# Patient Record
Sex: MALE | NOT HISPANIC OR LATINO | Employment: FULL TIME | URBAN - NONMETROPOLITAN AREA
[De-identification: names, ages, dates, MRNs, and addresses within clinical notes are randomized per-mention and may not be internally consistent; named-entity substitution may affect disease eponyms.]

---

## 2017-01-06 ENCOUNTER — HOSPITAL ENCOUNTER (INPATIENT)
Facility: HOSPITAL | Age: 47
LOS: 2 days | Discharge: HOME | End: 2017-01-08
Attending: FAMILY MEDICINE | Admitting: INTERNAL MEDICINE

## 2017-01-06 ENCOUNTER — APPOINTMENT (OUTPATIENT)
Dept: GENERAL RADIOLOGY | Facility: HOSPITAL | Age: 47
End: 2017-01-06

## 2017-01-06 DIAGNOSIS — I21.02 ST ELEVATION (STEMI) MYOCARDIAL INFARCTION INVOLVING LEFT ANTERIOR DESCENDING CORONARY ARTERY (HCC): ICD-10-CM

## 2017-01-06 DIAGNOSIS — I24.1 POSTMYOCARDIAL INFARCTION SYNDROME (HCC): Primary | ICD-10-CM

## 2017-01-06 LAB
ALBUMIN SERPL-MCNC: 4.2 G/DL (ref 3.5–5)
ALBUMIN/GLOB SERPL: 1.3 G/DL (ref 1.1–2.5)
ALP SERPL-CCNC: 73 U/L (ref 24–120)
ALT SERPL W P-5'-P-CCNC: 94 U/L (ref 0–54)
ANION GAP SERPL CALCULATED.3IONS-SCNC: 12 MMOL/L (ref 4–13)
AST SERPL-CCNC: 54 U/L (ref 7–45)
BASOPHILS # BLD AUTO: 0.04 10*3/MM3 (ref 0–0.2)
BASOPHILS NFR BLD AUTO: 0.5 % (ref 0–2)
BILIRUB SERPL-MCNC: 0.5 MG/DL (ref 0.1–1)
BUN BLD-MCNC: 20 MG/DL (ref 5–21)
BUN/CREAT SERPL: 23.3 (ref 7–25)
CALCIUM SPEC-SCNC: 9.2 MG/DL (ref 8.4–10.4)
CHLORIDE SERPL-SCNC: 98 MMOL/L (ref 98–110)
CO2 SERPL-SCNC: 25 MMOL/L (ref 24–31)
CREAT BLD-MCNC: 0.86 MG/DL (ref 0.5–1.4)
DEPRECATED RDW RBC AUTO: 41 FL (ref 40–54)
EOSINOPHIL # BLD AUTO: 0.35 10*3/MM3 (ref 0–0.7)
EOSINOPHIL NFR BLD AUTO: 4.1 % (ref 0–4)
ERYTHROCYTE [DISTWIDTH] IN BLOOD BY AUTOMATED COUNT: 12.7 % (ref 12–15)
GFR SERPL CREATININE-BSD FRML MDRD: 116 ML/MIN/1.73
GFR SERPL CREATININE-BSD FRML MDRD: 96 ML/MIN/1.73
GLOBULIN UR ELPH-MCNC: 3.3 GM/DL
GLUCOSE BLD-MCNC: 116 MG/DL (ref 70–100)
HCT VFR BLD AUTO: 42.4 % (ref 40–52)
HGB BLD-MCNC: 14.9 G/DL (ref 14–18)
IMM GRANULOCYTES # BLD: 0.04 10*3/MM3 (ref 0–0.03)
IMM GRANULOCYTES NFR BLD: 0.5 % (ref 0–5)
INR PPP: 0.94 (ref 0.91–1.09)
LYMPHOCYTES # BLD AUTO: 2.72 10*3/MM3 (ref 0.72–4.86)
LYMPHOCYTES NFR BLD AUTO: 31.7 % (ref 15–45)
MCH RBC QN AUTO: 30.8 PG (ref 28–32)
MCHC RBC AUTO-ENTMCNC: 35.1 G/DL (ref 33–36)
MCV RBC AUTO: 87.6 FL (ref 82–95)
MONOCYTES # BLD AUTO: 0.67 10*3/MM3 (ref 0.19–1.3)
MONOCYTES NFR BLD AUTO: 7.8 % (ref 4–12)
NEUTROPHILS # BLD AUTO: 4.76 10*3/MM3 (ref 1.87–8.4)
NEUTROPHILS NFR BLD AUTO: 55.4 % (ref 39–78)
PLATELET # BLD AUTO: 369 10*3/MM3 (ref 130–400)
PMV BLD AUTO: 10.1 FL (ref 6–12)
POTASSIUM BLD-SCNC: 4.4 MMOL/L (ref 3.5–5.3)
PROT SERPL-MCNC: 7.5 G/DL (ref 6.3–8.7)
PROTHROMBIN TIME: 12.9 SECONDS (ref 11.9–14.6)
RBC # BLD AUTO: 4.84 10*6/MM3 (ref 4.8–5.9)
SODIUM BLD-SCNC: 135 MMOL/L (ref 135–145)
TROPONIN I SERPL-MCNC: 0.26 NG/ML (ref 0–0.07)
WBC NRBC COR # BLD: 8.58 10*3/MM3 (ref 4.8–10.8)

## 2017-01-06 PROCEDURE — 93010 ELECTROCARDIOGRAM REPORT: CPT | Performed by: INTERNAL MEDICINE

## 2017-01-06 PROCEDURE — 85025 COMPLETE CBC W/AUTO DIFF WBC: CPT | Performed by: FAMILY MEDICINE

## 2017-01-06 PROCEDURE — 99285 EMERGENCY DEPT VISIT HI MDM: CPT

## 2017-01-06 PROCEDURE — 25010000002 ONDANSETRON PER 1 MG: Performed by: FAMILY MEDICINE

## 2017-01-06 PROCEDURE — 71010 HC CHEST PA OR AP: CPT

## 2017-01-06 PROCEDURE — 85610 PROTHROMBIN TIME: CPT | Performed by: FAMILY MEDICINE

## 2017-01-06 PROCEDURE — 80053 COMPREHEN METABOLIC PANEL: CPT | Performed by: FAMILY MEDICINE

## 2017-01-06 PROCEDURE — 93005 ELECTROCARDIOGRAM TRACING: CPT | Performed by: FAMILY MEDICINE

## 2017-01-06 PROCEDURE — 84484 ASSAY OF TROPONIN QUANT: CPT

## 2017-01-06 PROCEDURE — 93005 ELECTROCARDIOGRAM TRACING: CPT

## 2017-01-06 PROCEDURE — 25010000002 MORPHINE PER 10 MG: Performed by: FAMILY MEDICINE

## 2017-01-06 RX ORDER — SODIUM CHLORIDE 0.9 % (FLUSH) 0.9 %
10 SYRINGE (ML) INJECTION AS NEEDED
Status: DISCONTINUED | OUTPATIENT
Start: 2017-01-06 | End: 2017-01-08 | Stop reason: HOSPADM

## 2017-01-06 RX ORDER — ATORVASTATIN CALCIUM 40 MG/1
80 TABLET, FILM COATED ORAL NIGHTLY
Status: COMPLETED | OUTPATIENT
Start: 2017-01-06 | End: 2017-01-07

## 2017-01-06 RX ORDER — COLCHICINE 0.6 MG/1
0.6 TABLET ORAL DAILY
Status: DISCONTINUED | OUTPATIENT
Start: 2017-01-06 | End: 2017-01-08 | Stop reason: HOSPADM

## 2017-01-06 RX ORDER — METOPROLOL TARTRATE 50 MG/1
50 TABLET, FILM COATED ORAL ONCE
Status: COMPLETED | OUTPATIENT
Start: 2017-01-06 | End: 2017-01-07

## 2017-01-06 RX ORDER — ONDANSETRON 2 MG/ML
4 INJECTION INTRAMUSCULAR; INTRAVENOUS ONCE
Status: COMPLETED | OUTPATIENT
Start: 2017-01-06 | End: 2017-01-06

## 2017-01-06 RX ORDER — MORPHINE SULFATE 4 MG/ML
4 INJECTION, SOLUTION INTRAMUSCULAR; INTRAVENOUS ONCE
Status: COMPLETED | OUTPATIENT
Start: 2017-01-06 | End: 2017-01-06

## 2017-01-06 RX ADMIN — ONDANSETRON 4 MG: 2 INJECTION INTRAMUSCULAR; INTRAVENOUS at 22:34

## 2017-01-06 RX ADMIN — MORPHINE SULFATE 4 MG: 4 INJECTION, SOLUTION INTRAMUSCULAR; INTRAVENOUS at 22:34

## 2017-01-06 RX ADMIN — COLCHICINE 0.6 MG: 0.6 TABLET, FILM COATED ORAL at 23:30

## 2017-01-06 NOTE — IP AVS SNAPSHOT
AFTER VISIT SUMMARY             David Sandifer           About your hospitalization     You were admitted on:  January 6, 2017 You last received care in the:  [facility]       Procedures & Surgeries         Medications    If you or your caregiver advised us that you are currently taking a medication and that medication is marked below as “Resume”, this simply indicates that we have reviewed those medications to make sure our new therapy recommendations do not interfere.  If you have concerns about medications other than those new ones which we are prescribing today, please consult the physician who prescribed them (or your primary physician).  Our review of your home medications is not meant to indicate that we are directing their use.             Your Medications      START taking these medications     colchicine 0.6 MG tablet   Take 1 tablet by mouth 2 (Two) Times a Day.   Last time this was given:  1/8/2017  9:19 AM             CONTINUE taking these medications     aspirin 81 MG chewable tablet   Chew 1 tablet Daily.   Last time this was given:  1/8/2017  9:19 AM           atorvastatin 80 MG tablet   Take 1 tablet by mouth Every Night.   Last time this was given:  1/7/2017  1:01 AM   Commonly known as:  LIPITOR           lisinopril 5 MG tablet   Take 1 tablet by mouth Daily.   Last time this was given:  1/8/2017  9:19 AM   Commonly known as:  PRINIVIL,ZESTRIL           metoprolol tartrate 50 MG tablet   Take 1 tablet by mouth Every 12 (Twelve) Hours.   Last time this was given:  1/7/2017  1:00 AM   Commonly known as:  LOPRESSOR           prasugrel 10 MG tablet   Take 1 tablet by mouth Daily.   Last time this was given:  1/8/2017  9:19 AM   Commonly known as:  EFFIENT                Where to Get Your Medications      These medications were sent to [facility]/pharmacy #[id number] - [place], [place] - [address] - [phone]  - [phone] FX  [address] [address]     Phone:   [phone]     colchicine 0.6 MG tablet                  Your Medications      Your Medication List           Morning Noon Evening Bedtime As Needed    aspirin 81 MG chewable tablet   Chew 1 tablet Daily.                                   atorvastatin 80 MG tablet   Take 1 tablet by mouth Every Night.   Commonly known as:  LIPITOR                                   colchicine 0.6 MG tablet   Take 1 tablet by mouth 2 (Two) Times a Day.                                   lisinopril 5 MG tablet   Take 1 tablet by mouth Daily.   Commonly known as:  PRINIVIL,ZESTRIL                                   metoprolol tartrate 50 MG tablet   Take 1 tablet by mouth Every 12 (Twelve) Hours.   Commonly known as:  LOPRESSOR                                   prasugrel 10 MG tablet   Take 1 tablet by mouth Daily.   Commonly known as:  EFFIENT                                            Instructions for After Discharge          Discharge Instructions       1. Take all medications as prescribed- Colchicine x1 month.   2. Monitor heart rate and blood pressure daily at home and bring readings to follow up  3. Call with any questions or concerns     Discharge References/Attachments     CARDIAC DIET, EASY-TO-READ (ENGLISH)    HOW TO TAKE YOUR BLOOD PRESSURE, EASY-TO-READ (ENGLISH)    PERICARDITIS (ENGLISH)       Follow-ups for After Discharge        Follow-up Information     Follow up with No Known Provider Follow up in 1 week(s).    Contact information:    [facility] [address]          Follow up with Jose Rasmussen MD .    Specialty:  Cardiology    Why:  As previously scheduled.     Contact information:    [address] [id number] [phone]        Scheduled Appointments     Apr 07, 2017  9:00 AM [facility] Follow Up with Jose Rasmussen MD   [facility] (--)    [address] [address] [phone]              [facility] Signup     [facility]  [employer] allows you to send messages to your doctor, view your test results, renew your prescriptions, schedule appointments, and more. To sign up, go to [employer] and click on the Sign Up Now link in the New User? box. Enter your [employer] Activation Code exactly as it appears below along with the last four digits of your Social Security Number and your Date of Birth () to complete the sign-up process. If you do not sign up before the expiration date, you must request a new code.    [employer] Activation Code: [email or web address]  Expires: 2017 12:22 PM    If you have questions, you can email [email or web address] or call [phone] to talk to our [employer] staff. Remember, [employer] is NOT to be used for urgent needs. For medical emergencies, dial 911.           Summary of Your Hospitalization        Reason for Hospitalization     Your primary diagnosis was:  Inflammation Of The Covering Of The Heart    Your diagnoses also included:  Mixed Hyperlipidemia, High Blood Pressure, Mobitz Type 2 Second Degree Heart Block, Coronary Artery Disease Involving Native Coronary Artery Of Native Heart Without Angina Pectoris      Care Providers     Provider Service Role Specialty    Ronaldo Gutierrez MD -- Attending Provider Cardiology      Your Allergies  Date Reviewed: 2017    No active allergies      Patient Belongings Returned     Document Return of Belongings Flowsheet     Were the patient bedside belongings sent home?   Yes   Belongings Retrieved from Security & Sent Home   N/A    Belongings Sent to Safe   --   Medications Retrieved from Pharmacy & Sent Home   N/A              More Information      Heart-Healthy Eating Plan  Heart-healthy meal planning includes:  · Limiting unhealthy fats.  · Increasing healthy fats.  · Making other small dietary changes.  You may need to talk with your doctor or a diet specialist (dietitian) to create an eating plan that is right for you.  WHAT TYPES OF FAT  "SHOULD I CHOOSE?  · Choose healthy fats. These include olive oil and canola oil, flaxseeds, walnuts, almonds, and seeds.  · Eat more omega-3 fats. These include salmon, mackerel, sardines, tuna, flaxseed oil, and ground flaxseeds. Try to eat fish at least twice each week.  · Limit saturated fats.    Saturated fats are often found in animal products, such as meats, butter, and cream.    Plant sources of saturated fats include palm oil, palm kernel oil, and coconut oil.  · Avoid foods with partially hydrogenated oils in them. These include stick margarine, some tub margarines, cookies, crackers, and other baked goods. These contain trans fats.  WHAT GENERAL GUIDELINES DO I NEED TO FOLLOW?  · Check food labels carefully. Identify foods with trans fats or high amounts of saturated fat.  · Fill one half of your plate with vegetables and green salads. Eat 4-5 servings of vegetables per day. A serving of vegetables is:    1 cup of raw leafy vegetables.    ½ cup of raw or cooked cut-up vegetables.    ½ cup of vegetable juice.  · Fill one fourth of your plate with whole grains. Look for the word \"whole\" as the first word in the ingredient list.  · Fill one fourth of your plate with lean protein foods.  · Eat 4-5 servings of fruit per day. A serving of fruit is:    One medium whole fruit.    ¼ cup of dried fruit.    ½ cup of fresh, frozen, or canned fruit.    ½ cup of 100% fruit juice.  · Eat more foods that contain soluble fiber. These include apples, broccoli, carrots, beans, peas, and barley. Try to get 20-30 g of fiber per day.  · Eat more home-cooked food. Eat less restaurant, buffet, and fast food.  · Limit or avoid alcohol.  · Limit foods high in starch and sugar.  · Avoid fried foods.  · Avoid frying your food. Try baking, boiling, grilling, or broiling it instead. You can also reduce fat by:    Removing the skin from poultry.    Removing all visible fats from meats.    Skimming the fat off of stews, soups, and " gravies before serving them.    Steaming vegetables in water or broth.  · Lose weight if you are overweight.  · Eat 4-5 servings of nuts, legumes, and seeds per week:    One serving of dried beans or legumes equals ½ cup after being cooked.    One serving of nuts equals 1½ ounces.    One serving of seeds equals ½ ounce or one tablespoon.  · You may need to keep track of how much salt or sodium you eat. This is especially true if you have high blood pressure. Talk with your doctor or dietitian to get more information.  WHAT FOODS CAN I EAT?  Grains  Breads, including Bahamian, white, guido, wheat, raisin, rye, oatmeal, and Italian. Tortillas that are neither fried nor made with lard or trans fat. Low-fat rolls, including hotdog and hamburger buns and English muffins. Biscuits. Muffins. Waffles. Pancakes. Light popcorn. Whole-grain cereals. Flatbread. Adelita toast. Pretzels. Breadsticks. Rusks. Low-fat snacks. Low-fat crackers, including oyster, saltine, matzo, krzysztof, animal, and rye. Rice and pasta, including brown rice and pastas that are made with whole wheat.   Vegetables  All vegetables.   Fruits  All fruits, but limit coconut.  Meats and Other Protein Sources  Lean, well-trimmed beef, veal, pork, and lamb. Chicken and turkey without skin. All fish and shellfish. Wild duck, rabbit, pheasant, and venison. Egg whites or low-cholesterol egg substitutes. Dried beans, peas, lentils, and tofu. Seeds and most nuts.  Dairy  Low-fat or nonfat cheeses, including ricotta, string, and mozzarella. Skim or 1% milk that is liquid, powdered, or evaporated. Buttermilk that is made with low-fat milk. Nonfat or low-fat yogurt.  Beverages  Mineral water. Diet carbonated beverages.  Sweets and Desserts  Sherbets and fruit ices. Honey, jam, marmalade, jelly, and syrups. Meringues and gelatins. Pure sugar candy, such as hard candy, jelly beans, gumdrops, mints, marshmallows, and small amounts of dark chocolate. Alexei food cake.  Eat all  sweets and desserts in moderation.  Fats and Oils  Nonhydrogenated (trans-free) margarines. Vegetable oils, including soybean, sesame, sunflower, olive, peanut, safflower, corn, canola, and cottonseed. Salad dressings or mayonnaise made with a vegetable oil. Limit added fats and oils that you use for cooking, baking, salads, and as spreads.  Other  Cocoa powder. Coffee and tea. All seasonings and condiments.  The items listed above may not be a complete list of recommended foods or beverages. Contact your dietitian for more options.  WHAT FOODS ARE NOT RECOMMENDED?  Grains  Breads that are made with saturated or trans fats, oils, or whole milk. Croissants. Butter rolls. Cheese breads. Sweet rolls. Donuts. Buttered popcorn. Chow mein noodles. High-fat crackers, such as cheese or butter crackers.  Meats and Other Protein Sources  Fatty meats, such as hotdogs, short ribs, sausage, spareribs, harry, rib eye roast or steak, and mutton. High-fat deli meats, such as salami and bologna. Caviar. Domestic duck and goose. Organ meats, such as kidney, liver, sweetbreads, and heart.  Dairy  Cream, sour cream, cream cheese, and creamed cottage cheese. Whole-milk cheeses, including blue (juan j), Marvin Philip, Brie, Burton, American, Havarti, Swiss, cheddar, Camembert, and Cornish Flat. Whole or 2% milk that is liquid, evaporated, or condensed. Whole buttermilk. Cream sauce or high-fat cheese sauce. Yogurt that is made from whole milk.  Beverages  Regular sodas and juice drinks with added sugar.  Sweets and Desserts  Frosting. Pudding. Cookies. Cakes other than melanie food cake. Candy that has milk chocolate or white chocolate, hydrogenated fat, butter, coconut, or unknown ingredients. Buttered syrups. Full-fat ice cream or ice cream drinks.  Fats and Oils  Gravy that has suet, meat fat, or shortening. Cocoa butter, hydrogenated oils, palm oil, coconut oil, palm kernel oil. These can often be found in baked products, candy, fried foods,  nondairy creamers, and whipped toppings. Solid fats and shortenings, including harry fat, salt pork, lard, and butter. Nondairy cream substitutes, such as coffee creamers and sour cream substitutes. Salad dressings that are made of unknown oils, cheese, or sour cream.  The items listed above may not be a complete list of foods and beverages to avoid. Contact your dietitian for more information.     This information is not intended to replace advice given to you by your health care provider. Make sure you discuss any questions you have with your health care provider.     Document Released: 06/18/2013 Document Revised: 01/08/2016 Document Reviewed: 06/11/2015  [employer] Interactive Patient Education ©2016 Elsevier Inc.          How to Take Your Blood Pressure  HOW DO I GET A BLOOD PRESSURE MACHINE?  · You can buy an electronic home blood pressure machine at your local pharmacy. Insurance will sometimes cover the cost if you have a prescription.  · Ask your doctor what type of machine is best for you. There are different machines for your arm and your wrist.  · If you decide to buy a machine to check your blood pressure on your arm, first check the size of your arm so you can buy the right size cuff. To check the size of your arm:      Use a measuring tape that shows both inches and centimeters.      Wrap the measuring tape around the upper-middle part of your arm. You may need someone to help you measure.      Write down your arm measurement in both inches and centimeters.    · To measure your blood pressure correctly, it is important to have the right size cuff.      If your arm is up to 13 inches (up to 34 centimeters), get an adult cuff size.    If your arm is 13 to 17 inches (35 to 44 centimeters), get a large adult cuff size.       If your arm is 17 to 20 inches (45 to 52 centimeters), get an adult thigh cuff.    WHAT DO THE NUMBERS MEAN?   · There are two numbers that make up your blood pressure. For example:  "120/80.    The first number (120 in our example) is called the \"systolic pressure.\" It is a measure of the pressure in your blood vessels when your heart is pumping blood.    The second number (80 in our example) is called the \"diastolic pressure.\" It is a measure of the pressure in your blood vessels when your heart is resting between beats.  · Your doctor will tell you what your blood pressure should be.  WHAT SHOULD I DO BEFORE I CHECK MY BLOOD PRESSURE?   · Try to rest or relax for at least 30 minutes before you check your blood pressure.  · Do not smoke.  · Do not have any drinks with caffeine, such as:    Soda.    Coffee.    Tea.  · Check your blood pressure in a quiet room.  · Sit down and stretch out your arm on a table. Keep your arm at about the level of your heart. Let your arm relax.  · Make sure that your legs are not crossed.  HOW DO I CHECK MY BLOOD PRESSURE?  · Follow the directions that came with your machine.  · Make sure you remove any tight-fitting clothing from your arm or wrist. Wrap the cuff around your upper arm or wrist. You should be able to fit a finger between the cuff and your arm. If you cannot fit a finger between the cuff and your arm, it is too tight and should be removed and rewrapped.  · Some units require you to manually pump up the arm cuff.  · Automatic units inflate the cuff when you press a button.  · Cuff deflation is automatic in both models.  · After the cuff is inflated, the unit measures your blood pressure and pulse. The readings are shown on a monitor. Hold still and breathe normally while the cuff is inflated.  · Getting a reading takes less than a minute.  · Some models store readings in a memory. Some provide a printout of readings. If your machine does not store your readings, keep a written record.  · Take readings with you to your next visit with your doctor.     This information is not intended to replace advice given to you by your health care provider. Make " sure you discuss any questions you have with your health care provider.     Document Released: 11/30/2009 Document Revised: 01/08/2016 Document Reviewed: 02/12/2015  [employer] Interactive Patient Education ©2016 [employer].          Pericarditis  Pericarditis is swelling (inflammation) of the pericardium. The pericardium is a thin, double-layered, fluid-filled tissue sac that surrounds the heart. The purpose of the pericardium is to contain the heart in the chest cavity and keep the heart from overexpanding. Different types of pericarditis can occur, such as:  · Acute pericarditis. Inflammation can develop suddenly in acute pericarditis.  · Chronic pericarditis. Inflammation develops gradually and is long-lasting in chronic pericarditis.  · Constrictive pericarditis. In this type of pericarditis, the layers of the pericardium stiffen and develop scar tissue. The scar tissue thickens and sticks together. This makes it difficult for the heart to pump and work as it normally does.  CAUSES   Pericarditis can be caused from different conditions, such as:  · A bacterial, fungal or viral infection.  · After a heart attack (myocardial infarction).  · After open-heart surgery (coronary bypass graft surgery).  · Auto-immune conditions such as lupus, rheumatoid arthritis or scleroderma.  · Kidney failure.  · Low thyroid condition (hypothyroidism).  · Cancer from another part of the body that has spread (metastasized) to the pericardium.  · Chest injury or trauma.  · After radiation treatment.  · Certain medicines.  SYMPTOMS   Symptoms of pericarditis can include:  · Chest pain. Chest pain symptoms may increase when laying down and may be relieved when sitting up and leaning forward.  · A chronic, dry cough.  · Heart palpitations. These may feel like rapid, fluttering or pounding heart beats.  · Chest pain may be worse when swallowing.  · Dizziness or fainting.  · Tiredness, fatigue or lethargy.  · Fever.  DIAGNOSIS    Pericarditis is diagnosed by the following:  · A physical exam. A heart sound called a pericardial friction rub may be heard when your caregiver listens to your heart.  · Blood work. Blood may be drawn to check for an infection and to look at your blood chemistry.  · Electrocardiography. During electrocardiography your heart's electrical activity is monitored and recorded with a tracing on paper (electrocardiogram [ECG]).  · Echocardiography.  · Computed tomography (CT).  · Magnetic resonance image (MRI).  TREATMENT   To treat pericarditis, it is important to know the cause of it. The cause of pericarditis determines the treatment.   · If the cause of pericarditis is due to an infection, treatment is based on the type of infection. If an infection is suspected in the pericardial fluid, a procedure called a pericardial fluid culture and biopsy may be done. This takes a sample of the pericardial fluid. The sample is sent to a lab which runs tests on the pericardial fluid to check for an infection.  · If the autoimmune disease is the cause, treatment of the autoimmune condition will help improve the pericarditis.  · If the cause of pericarditis is not known, anti-inflammatory medicines may be used to help decrease the inflammation.  · Surgery may be needed. The following are types of surgeries or procedures that may be done to treat pericarditis:    Pericardial window. A pericardial window makes a cut (incision) into the pericardial sac. This allows excess fluid in the pericardium to drain.    Pericardiocentesis. A pericardiocentesis is also known as a pericardial tap. This procedure uses a needle that is guided by X-ray to drain (aspirate) excess fluid from the pericardium.    Pericardiectomy. A pericardiectomy removes part or all of the pericardium.  HOME CARE INSTRUCTIONS   · Do not smoke. If you smoke, quit. Your caregiver can help you quit smoking.  · Maintain a healthy weight.  · Follow an exercise program as  directed by your health care provider. You may need to limit your exercising until your symptoms go away.  · If you drink alcohol, do so in moderation.  · Eat a heart healthy diet. A registered dietitian can help you learn about healthy food choices.  · Keep a list of all your medicines with you at all times. Include the name, dose, how often it is taken and how it is taken.  SEEK IMMEDIATE MEDICAL CARE IF:   · You have chest pain or feelings of chest pressure.  · You have sweating (diaphoresis) when at rest.  · You have irregular heartbeats (palpitations).  · You have rapid, racing heart beats.  · You have unexplained fainting episodes.  · You feel sick to your stomach (nausea) or vomiting without cause.  · You have unexplained weakness.  If you develop any of the symptoms which originally made you seek care, call for local emergency medical help. Do not drive yourself to the hospital.     This information is not intended to replace advice given to you by your health care provider. Make sure you discuss any questions you have with your health care provider.     Document Released: 06/13/2002 Document Revised: 05/03/2016 Document Reviewed: 06/29/2016  [employer] Interactive Patient Education ©2016 [employer]            SYMPTOMS OF A STROKE    Call 911 or have someone take you to the Emergency Department if you have any of the following:    · Sudden numbness or weakness of your face, arm or leg especially on one side of the body  · Sudden confusion, diffiiculty speaking or trouble understanding   · Changes in your vision or loss of sight in one eye  · Sudden severe headache with no known cause  · sudden dizziness, trouble walking, loss of balance or coordination    It is important to seek emergency care right away if you have further stroke symptoms. If you get emergency help quickly, the powerful clot-dissolving medicines can reduce the disabilities caused by a stroke.     For more information:    American Stroke  Association  1-087-2-STROKE  www.strokeassociation.org           IF YOU SMOKE OR USE TOBACCO PLEASE READ THE FOLLOWING:    Why is smoking bad for me?  Smoking increases the risk of heart disease, lung disease, vascular disease, stroke, and cancer.     If you smoke, STOP!    If you would like more information on quitting smoking, please visit the [employer] website: [email or web address]   This link will provide additional resources including the QUIT line and the Beat the Pack support groups.     For more information:    American Cancer Society  ([phone] 830-1744    American Heart Association  1-691.765.4923               YOU ARE THE MOST IMPORTANT FACTOR IN YOUR RECOVERY.     Follow all instructions carefully.     I have reviewed my discharge instructions with my nurse, including the following information, if applicable:     Information about my illness and diagnosis   Follow up appointments (including lab draws)   Wound Care   Equipment Needs   Medications (new and continuing) along with side effects   Preventative information such as vaccines and smoking cessations   Diet   Pain   I know when to contact my Doctor's office or seek emergency care      I want my nurse to describe the side effects of my medications: YES NO   If the answer is no, I understand the side effects of my medications: YES NO   My nurse described the side effects of my medications in a way that I could understand: YES NO   I have taken my personal belongings and my own medications with me at discharge: YES NO            I have received this information and my questions have been answered. I have discussed any concerns I see with this plan with the nurse or physician. I understand these instructions.    Signature of Patient or Responsible Person: _____________________________________    Date: _________________  Time: __________________    Signature of Healthcare Provider:  _______________________________________  Date: _________________  Time: __________________

## 2017-01-07 ENCOUNTER — APPOINTMENT (OUTPATIENT)
Dept: CARDIOLOGY | Facility: HOSPITAL | Age: 47
End: 2017-01-07

## 2017-01-07 PROBLEM — I44.1 MOBITZ TYPE 2 SECOND DEGREE HEART BLOCK: Status: ACTIVE | Noted: 2017-01-07

## 2017-01-07 PROBLEM — I25.10 CORONARY ARTERY DISEASE INVOLVING NATIVE CORONARY ARTERY OF NATIVE HEART WITHOUT ANGINA PECTORIS: Status: ACTIVE | Noted: 2017-01-07

## 2017-01-07 PROBLEM — E66.9 OBESITY: Status: ACTIVE | Noted: 2017-01-07

## 2017-01-07 LAB
HOLD SPECIMEN: NORMAL
HOLD SPECIMEN: NORMAL
WHOLE BLOOD HOLD SPECIMEN: NORMAL
WHOLE BLOOD HOLD SPECIMEN: NORMAL

## 2017-01-07 PROCEDURE — 99223 1ST HOSP IP/OBS HIGH 75: CPT | Performed by: INTERNAL MEDICINE

## 2017-01-07 PROCEDURE — 25010000002 PERFLUTREN (DEFINITY) 8.476 MG IN SODIUM CHLORIDE 10 ML INJECTION: Performed by: INTERNAL MEDICINE

## 2017-01-07 PROCEDURE — C8929 TTE W OR WO FOL WCON,DOPPLER: HCPCS

## 2017-01-07 PROCEDURE — 25010000002 ENOXAPARIN PER 10 MG: Performed by: NURSE PRACTITIONER

## 2017-01-07 PROCEDURE — 93306 TTE W/DOPPLER COMPLETE: CPT | Performed by: INTERNAL MEDICINE

## 2017-01-07 RX ORDER — PRASUGREL 10 MG/1
10 TABLET, FILM COATED ORAL DAILY
Status: DISCONTINUED | OUTPATIENT
Start: 2017-01-07 | End: 2017-01-08 | Stop reason: HOSPADM

## 2017-01-07 RX ORDER — ASPIRIN 81 MG/1
81 TABLET, CHEWABLE ORAL DAILY
Status: DISCONTINUED | OUTPATIENT
Start: 2017-01-07 | End: 2017-01-08 | Stop reason: HOSPADM

## 2017-01-07 RX ORDER — LISINOPRIL 5 MG/1
5 TABLET ORAL
Status: DISCONTINUED | OUTPATIENT
Start: 2017-01-07 | End: 2017-01-08 | Stop reason: HOSPADM

## 2017-01-07 RX ADMIN — ENOXAPARIN SODIUM 40 MG: 40 INJECTION SUBCUTANEOUS at 10:24

## 2017-01-07 RX ADMIN — DESMOPRESSIN ACETATE 80 MG: 0.2 TABLET ORAL at 01:01

## 2017-01-07 RX ADMIN — METOPROLOL TARTRATE 50 MG: 50 TABLET ORAL at 01:00

## 2017-01-07 RX ADMIN — LISINOPRIL 5 MG: 5 TABLET ORAL at 10:24

## 2017-01-07 RX ADMIN — PRASUGREL HYDROCHLORIDE 10 MG: 10 TABLET, FILM COATED ORAL at 10:25

## 2017-01-07 RX ADMIN — SODIUM CHLORIDE 5 ML: 9 INJECTION INTRAMUSCULAR; INTRAVENOUS; SUBCUTANEOUS at 15:42

## 2017-01-07 RX ADMIN — ASPIRIN 81 MG 81 MG: 81 TABLET ORAL at 10:24

## 2017-01-07 NOTE — ED PROVIDER NOTES
Subjective   Patient is a 47 y.o. male presenting with chest pain.   History provided by:  Patient  Chest Pain   Pain location:  Substernal area  Pain quality: aching    Pain radiates to:  Does not radiate  Pain severity:  Moderate  Onset quality:  Gradual  Timing:  Constant  Progression:  Waxing and waning  Context: not breathing and not drug use    Relieved by:  Nothing  Worsened by:  Coughing, deep breathing and movement  Ineffective treatments:  Aspirin, nitroglycerin, oxygen and rest  Associated symptoms: diaphoresis, heartburn, nausea, palpitations and shortness of breath    Associated symptoms: no abdominal pain, no AICD problem, no altered mental status, no anorexia, no anxiety, no back pain, no claudication, no cough, no dizziness, no dysphagia, no fatigue, no fever, no headache, no near-syncope, no numbness, no orthopnea, no PND, no syncope, no vomiting and no weakness    Risk factors: coronary artery disease, hypertension and smoking (former)    Risk factors: no aortic disease, no birth control, no high cholesterol and not obese        Review of Systems   Constitutional: Positive for diaphoresis. Negative for appetite change, chills, fatigue, fever and unexpected weight change.   HENT: Negative.  Negative for trouble swallowing.    Eyes: Negative for photophobia, pain, discharge, redness, itching and visual disturbance.   Respiratory: Positive for chest tightness and shortness of breath. Negative for apnea, cough, choking, wheezing and stridor.    Cardiovascular: Positive for chest pain and palpitations. Negative for orthopnea, claudication, leg swelling, syncope, PND and near-syncope.   Gastrointestinal: Positive for heartburn and nausea. Negative for abdominal pain, anorexia, blood in stool, constipation, diarrhea and vomiting.   Endocrine: Negative for cold intolerance, heat intolerance, polydipsia, polyphagia and polyuria.   Genitourinary: Negative for dysuria, enuresis, flank pain, frequency,  hematuria and urgency.   Musculoskeletal: Negative for arthralgias, back pain, gait problem, joint swelling and myalgias.   Skin: Negative for color change, pallor and rash.   Allergic/Immunologic: Negative.    Neurological: Negative for dizziness, seizures, syncope, weakness, numbness and headaches.   Hematological: Negative.    Psychiatric/Behavioral: Negative.        Past Medical History   Diagnosis Date   • Coronary artery disease    • Hypertension    • Myocardial infarction 12/25/2016     STEMI s/p DELIA LAD        No Known Allergies    Past Surgical History   Procedure Laterality Date   • Cardiac catheterization N/A 12/25/2016     Procedure: Left Heart Cath;  Surgeon: Jose Rasmussen MD;  Location:  [facility] CATH INVASIVE LOCATION;  Service:    • Coronary stent placement  12/25/2016     LAD        History reviewed. No pertinent family history.    Social History     Social History   • Marital status:      Spouse name: N/A   • Number of children: N/A   • Years of education: N/A     Social History Main Topics   • Smoking status: Former Smoker   • Smokeless tobacco: None   • Alcohol use 1.2 oz/week     2 Cans of beer per week   • Drug use: No   • Sexual activity: Not Asked     Other Topics Concern   • None     Social History Narrative       Prior to Admission medications    Medication Sig Start Date End Date Taking? Authorizing Provider   aspirin 81 MG chewable tablet Chew 1 tablet Daily. 12/28/16   Jessica Phelan PA-C   atorvastatin (LIPITOR) 80 MG tablet Take 1 tablet by mouth Every Night. 12/28/16   Jessica Phelan PA-C   lisinopril (PRINIVIL,ZESTRIL) 5 MG tablet Take 1 tablet by mouth Daily. 12/28/16   Jessica Phelan PA-C   metoprolol tartrate (LOPRESSOR) 50 MG tablet Take 1 tablet by mouth Every 12 (Twelve) Hours. 12/28/16   Jessica Phelan PA-C   prasugrel (EFFIENT) 10 MG tablet Take 1 tablet by mouth Daily. 12/28/16   Jessica Phelan PA-C       Medications   Morphine sulfate (PF) injection 4 mg (4 mg  "Intravenous Given 1/6/17 2234)   ondansetron (ZOFRAN) injection 4 mg (4 mg Intravenous Given 1/6/17 2234)   metoprolol tartrate (LOPRESSOR) tablet 50 mg (50 mg Oral Given 1/7/17 0100)   atorvastatin (LIPITOR) tablet 80 mg (80 mg Oral Given 1/7/17 0101)   perflutren (DEFINITY) 8.476 mg in sodium chloride 10 mL injection (5 mL Intravenous Given 1/7/17 1542)       Visit Vitals   • /62 (BP Location: Left arm, Patient Position: Lying)   • Pulse 89   • Temp 98 °F (36.7 °C) (Temporal Artery )   • Resp 18   • Ht 72\" (182.9 cm)   • Wt 244 lb 3 oz (111 kg)   • SpO2 96%   • BMI 33.12 kg/m2         Objective   Physical Exam   Constitutional: He is oriented to person, place, and time. He appears well-developed and well-nourished. No distress.   HENT:   Head: Normocephalic and atraumatic.   Right Ear: External ear normal.   Left Ear: External ear normal.   Nose: Nose normal.   Mouth/Throat: Oropharynx is clear and moist.   Eyes: Conjunctivae and EOM are normal. Pupils are equal, round, and reactive to light.   Neck: Normal range of motion. Neck supple. No thyromegaly present.   Cardiovascular: Normal rate, regular rhythm and intact distal pulses.  Exam reveals no gallop and no friction rub.    Murmur heard.  Pulmonary/Chest: Effort normal and breath sounds normal. No respiratory distress. He has no wheezes. He has no rales. He exhibits no tenderness.   Abdominal: Soft. Bowel sounds are normal. He exhibits no distension. There is no tenderness.   Genitourinary:   Genitourinary Comments: N/A   Musculoskeletal: Normal range of motion.   Neurological: He is alert and oriented to person, place, and time. He has normal reflexes. No cranial nerve deficit.   No gross motor or sensory deficits    Skin: Skin is warm and dry. No rash noted. He is not diaphoretic. No erythema. No pallor.   Psychiatric: He has a normal mood and affect. His behavior is normal. Judgment and thought content normal.   Nursing note and vitals " reviewed.      Procedures         Lab Results (last 24 hours)     ** No results found for the last 24 hours. **          XR Chest 1 View   Final Result   No acute findings.   This report was finalized on 01/07/2017 09:56 by Dr. Jose Rafael Hudson MD.          ED Course  ED Course          MDM  Number of Diagnoses or Management Options  Postmyocardial infarction syndrome:   ST elevation (STEMI) myocardial infarction involving left anterior descending coronary artery:   Diagnosis management comments: stemi       Amount and/or Complexity of Data Reviewed  Clinical lab tests: ordered and reviewed  Tests in the radiology section of CPT®: reviewed and ordered  Independent visualization of images, tracings, or specimens: yes    Risk of Complications, Morbidity, and/or Mortality  Presenting problems: high  Diagnostic procedures: moderate  Management options: moderate  General comments: stemi seen on ekg, dr coelho here in the ED and he has seen the EKG and compared it to previous which shows improvement. --STEMI last week is now improved. Dr coelho states that this is not a stemi, but a post MI pericarditis. He instructed the pt to receive colchicine and be admitted    Patient Progress  Patient progress: stable      Diagnoses that have been ruled out:   None   Diagnoses that are still under consideration:   None   Final diagnoses:   ST elevation (STEMI) myocardial infarction involving left anterior descending coronary artery   Postmyocardial infarction syndrome (Primary)       Final diagnoses:   ST elevation (STEMI) myocardial infarction involving left anterior descending coronary artery   Postmyocardial infarction syndrome            Catalina South MD  01/13/17 0528

## 2017-01-07 NOTE — PLAN OF CARE
Problem: Acute Coronary Syndrome (ACS) (Adult)  Goal: Signs and Symptoms of Listed Potential Problems Will be Absent or Manageable (Acute Coronary Syndrome)  Outcome: Ongoing (interventions implemented as appropriate)    01/07/17 0129   Acute Coronary Syndrome (ACS)   Problems Assessed (Acute Coronary Syndrome (ACS)) all   Problems Present (Acute Coronary Syndrome (ACS)) chest pain (angina);situational response;pericarditis

## 2017-01-07 NOTE — H&P
"[facility] HEART GROUP HISTORY AND PHYSICAL      Patient Care Team:  No Known Provider as PCP - General    Chief complaint: Chest Pain     Subjective     David Sandifer is a 46 y.o. male with a known PMH significant for CAD s/p STEMI with DELIA to the proximal LAD (12/25/16). Patient presented to [facility] via ED with complaints of chest pain. Patient reports that he has been doing well since his MI 2 weeks ago and taking his medications as prescribed. Yesterday evening, he was coughing, when he had a sudden onset of midsternal chest pain associated with taking deep breaths and coughing, non exertional. Pain has improved overnight. Denies any additional complaints at this time.     Review of Systems  Review of Systems   Constitution: Negative for chills, diaphoresis, fever, weakness and malaise/fatigue.   HENT: Negative for stridor.    Cardiovascular: Positive for chest pain and dyspnea on exertion (\"Improving\"). Negative for irregular heartbeat, leg swelling, near-syncope, orthopnea, palpitations, paroxysmal nocturnal dyspnea and syncope.   Respiratory: Positive for cough. Negative for hemoptysis, sputum production and wheezing.    Skin: Negative for rash.   Musculoskeletal: Negative for falls.   Gastrointestinal: Negative for bloating, abdominal pain, nausea and vomiting.   Neurological: Negative for dizziness and focal weakness.   Psychiatric/Behavioral: Negative for altered mental status.        History  Past Medical History   Diagnosis Date   • Coronary artery disease    • Hypertension    • Myocardial infarction 12/25/2016     STEMI s/p DELIA LAD      Past Surgical History   Procedure Laterality Date   • Cardiac catheterization N/A 12/25/2016     Procedure: Left Heart Cath;  Surgeon: Jose Rasmussen MD;  Location: [facility] CATH INVASIVE LOCATION;  Service:    • Coronary stent placement  12/25/2016     LAD      History reviewed. No pertinent family history.  Social History   Substance Use Topics   • Smoking " status: Former Smoker   • Smokeless tobacco: None   • Alcohol use 1.2 oz/week     2 Cans of beer per week       Medications  Prior to Admission medications    Medication Sig Start Date End Date Taking? Authorizing Provider   aspirin 81 MG chewable tablet Chew 1 tablet Daily. 12/28/16   Jessica Phelan PA-C   atorvastatin (LIPITOR) 80 MG tablet Take 1 tablet by mouth Every Night. 12/28/16   Jessica Phelan PA-C   lisinopril (PRINIVIL,ZESTRIL) 5 MG tablet Take 1 tablet by mouth Daily. 12/28/16   Jessica Phelan PA-C   metoprolol tartrate (LOPRESSOR) 50 MG tablet Take 1 tablet by mouth Every 12 (Twelve) Hours. 12/28/16   Jessica Phelan PA-C   prasugrel (EFFIENT) 10 MG tablet Take 1 tablet by mouth Daily. 12/28/16   Jessica Phelan PA-C       Current Facility-Administered Medications   Medication Dose Route Frequency Provider Last Rate Last Dose   • aspirin chewable tablet 81 mg  81 mg Oral Daily SANDOR Osuna       • colchicine tablet 0.6 mg  0.6 mg Oral Daily Catalina South MD   0.6 mg at 01/06/17 2330   • enoxaparin (LOVENOX) syringe 40 mg  40 mg Subcutaneous Q24H SANDOR Osuna       • Influenza Vac Subunit Quad (FLUCELVAX) injection 0.5 mL  0.5 mL Intramuscular During Hospitalization Ronaldo Gutierrez MD       • lisinopril (PRINIVIL,ZESTRIL) tablet 5 mg  5 mg Oral Q24H SANDOR Osuna       • prasugrel (EFFIENT) tablet 10 mg  10 mg Oral Daily SANDOR Osuna       • sodium chloride 0.9 % flush 10 mL  10 mL Intravenous PRN Catalina South MD            Allergies:  Review of patient's allergies indicates no known allergies.    Objective     Vital Signs  Temp:  [97.5 °F (36.4 °C)-98 °F (36.7 °C)] 97.8 °F (36.6 °C)  Heart Rate:  [70-80] 71  Resp:  [18-20] 20  BP: (101-138)/(66-83) 107/66    Labs  Lab Results   Component Value Date    WBC 8.58 01/06/2017    HGB 14.9 01/06/2017    HCT 42.4 01/06/2017    MCV 87.6 01/06/2017     01/06/2017     Lab Results   Component Value Date    GLUCOSE  116 (H) 01/06/2017    CALCIUM 9.2 01/06/2017     01/06/2017    K 4.4 01/06/2017    CO2 25.0 01/06/2017    CL 98 01/06/2017    BUN 20 01/06/2017    CREATININE 0.86 01/06/2017    EGFRIFAFRI 116 01/06/2017    EGFRIFNONA 96 01/06/2017    BCR 23.3 01/06/2017    ANIONGAP 12.0 01/06/2017       Physical Exam:  Physical Exam   Constitutional: He is oriented to person, place, and time. Vital signs are normal. He appears well-developed and well-nourished. He is cooperative. No distress.   Sitting on the side of the bed, wife at bedside.    HENT:   Head: Normocephalic and atraumatic.   Cardiovascular: Normal rate, regular rhythm, S1 normal, S2 normal, normal heart sounds, intact distal pulses and normal pulses.    No murmur heard.  Pulmonary/Chest: Effort normal and breath sounds normal.   Nontender to palpation    Abdominal: Soft. Normal appearance and bowel sounds are normal. There is no tenderness.   Musculoskeletal: He exhibits no edema.   Neurological: He is alert and oriented to person, place, and time.   Skin: Skin is warm and dry. No rash noted. He is not diaphoretic.   Psychiatric: He has a normal mood and affect. His speech is normal and behavior is normal.   Vitals reviewed.      Results Review:    I reviewed the patient's new clinical results.  I personally viewed and interpreted the patient's EKG/Telemetry data    Assessment     Principal Problem:    -Post-MI pericarditis    Active Problems:    -Mobitz type 2 second degree heart block, intermittent. No further events overnight.     -Coronary artery disease involving native coronary artery of native heart without angina pectoris s/p STEMI with DELIA to the proximal LAD 12/25/16     -Mixed hyperlipidemia    -Essential hypertension- controlled     -Obesity      Plan   1. Monitor telemetry  2. Colchicine 0.6mg PO daily- consider increasing to BID   3. Restart home medications- ASA, Effient, Lipitor, Lisinopril   4. Hold BB due to intermittent 2nd Degree block  5.  VTE with Lovenox   6. Obtain Echo  7. Healthy Heart Diet     I discussed the patients findings and my recommendations with patient and nursing staff. Further recommendations per Dr. Gutierrez upon his evaluation of the patient.     SANDOR Osuna  01/07/17  8:25 AM      Please note this cardiology history and physical note is the result of a face to face consultation with the patient, in addition to reviewing medical records at length by myself, Tara Fong, APRN.     Time: More than 50% of time spent in counseling and coordination of care:  Total face-to-face/floor time 35 min.  Time spent in counseling 20 min. Counseling included the following topics: lab results, ECG results, assessment, plan of care

## 2017-01-07 NOTE — PLAN OF CARE
Problem: Patient Care Overview (Adult)  Goal: Plan of Care Review  Outcome: Ongoing (interventions implemented as appropriate)  Goal: Adult Individualization and Mutuality  Outcome: Ongoing (interventions implemented as appropriate)  Goal: Discharge Needs Assessment  Outcome: Ongoing (interventions implemented as appropriate)    Problem: Acute Coronary Syndrome (ACS) (Adult)  Goal: Signs and Symptoms of Listed Potential Problems Will be Absent or Manageable (Acute Coronary Syndrome)  Outcome: Ongoing (interventions implemented as appropriate)

## 2017-01-07 NOTE — PLAN OF CARE
Problem: Patient Care Overview (Adult)  Goal: Plan of Care Review  Outcome: Ongoing (interventions implemented as appropriate)    01/07/17 0126   Coping/Psychosocial Response Interventions   Plan Of Care Reviewed With patient;spouse   Patient Care Overview   Progress progress toward functional goals as expected       Goal: Adult Individualization and Mutuality  Outcome: Ongoing (interventions implemented as appropriate)    01/07/17 0126   Individualization   Patient Specific Goals No more chest pain   Patient Specific Interventions Positioning, meds as needed       Goal: Discharge Needs Assessment  Outcome: Ongoing (interventions implemented as appropriate)    01/07/17 0126   Discharge Needs Assessment   Concerns To Be Addressed no discharge needs identified   Readmission Within The Last 30 Days (Had an emergent heart cath on 12/25/16)   Equipment Needed After Discharge none   Discharge Disposition home or self-care;still a patient   Current Health   Anticipated Changes Related to Illness none   Living Environment   Transportation Available family or friend will provide

## 2017-01-08 VITALS
BODY MASS INDEX: 33.07 KG/M2 | OXYGEN SATURATION: 96 % | HEART RATE: 89 BPM | WEIGHT: 244.19 LBS | SYSTOLIC BLOOD PRESSURE: 114 MMHG | DIASTOLIC BLOOD PRESSURE: 62 MMHG | TEMPERATURE: 98 F | RESPIRATION RATE: 18 BRPM | HEIGHT: 72 IN

## 2017-01-08 LAB
BH CV ECHO MEAS - AO MAX PG (FULL): 1.7 MMHG
BH CV ECHO MEAS - AO MAX PG: 6.2 MMHG
BH CV ECHO MEAS - AO MEAN PG (FULL): 1 MMHG
BH CV ECHO MEAS - AO MEAN PG: 3 MMHG
BH CV ECHO MEAS - AO ROOT AREA: 12.6 CM^2
BH CV ECHO MEAS - AO ROOT DIAM: 4 CM
BH CV ECHO MEAS - AO V2 MAX: 124 CM/SEC
BH CV ECHO MEAS - AO V2 MEAN: 85 CM/SEC
BH CV ECHO MEAS - AO V2 VTI: 21.4 CM
BH CV ECHO MEAS - AVA(I,A): 2.9 CM^2
BH CV ECHO MEAS - AVA(I,D): 2.9 CM^2
BH CV ECHO MEAS - AVA(V,A): 2.7 CM^2
BH CV ECHO MEAS - AVA(V,D): 2.7 CM^2
BH CV ECHO MEAS - CONTRAST EF 4CH: 53.8 ML/M^2
BH CV ECHO MEAS - EDV(CUBED): 136.6 ML
BH CV ECHO MEAS - EDV(MOD-SP4): 190 ML
BH CV ECHO MEAS - EDV(TEICH): 126.6 ML
BH CV ECHO MEAS - EF(CUBED): 60.8 %
BH CV ECHO MEAS - EF(MOD-SP4): 53.8 %
BH CV ECHO MEAS - EF(TEICH): 52 %
BH CV ECHO MEAS - ESV(CUBED): 53.6 ML
BH CV ECHO MEAS - ESV(MOD-SP4): 87.7 ML
BH CV ECHO MEAS - ESV(TEICH): 60.8 ML
BH CV ECHO MEAS - FS: 26.8 %
BH CV ECHO MEAS - IVS/LVPW: 1
BH CV ECHO MEAS - IVSD: 1.2 CM
BH CV ECHO MEAS - LA DIMENSION: 3.7 CM
BH CV ECHO MEAS - LA/AO: 0.93
BH CV ECHO MEAS - LAT PEAK E' VEL: 9.5 CM/SEC
BH CV ECHO MEAS - LV MASS(C)D: 253.6 GRAMS
BH CV ECHO MEAS - LV MAX PG: 4.5 MMHG
BH CV ECHO MEAS - LV MEAN PG: 2 MMHG
BH CV ECHO MEAS - LV V1 MAX: 106 CM/SEC
BH CV ECHO MEAS - LV V1 MEAN: 67.2 CM/SEC
BH CV ECHO MEAS - LV V1 VTI: 19.5 CM
BH CV ECHO MEAS - LVIDD: 5.2 CM
BH CV ECHO MEAS - LVIDS: 3.8 CM
BH CV ECHO MEAS - LVLD AP4: 10.8 CM
BH CV ECHO MEAS - LVLS AP4: 8.9 CM
BH CV ECHO MEAS - LVOT AREA (M): 3.1 CM^2
BH CV ECHO MEAS - LVOT AREA: 3.1 CM^2
BH CV ECHO MEAS - LVOT DIAM: 2 CM
BH CV ECHO MEAS - LVPWD: 1.2 CM
BH CV ECHO MEAS - MV A MAX VEL: 55.1 CM/SEC
BH CV ECHO MEAS - MV DEC TIME: 0.19 SEC
BH CV ECHO MEAS - MV E MAX VEL: 102 CM/SEC
BH CV ECHO MEAS - MV E/A: 1.9
BH CV ECHO MEAS - RAP SYSTOLE: 5 MMHG
BH CV ECHO MEAS - RVSP: 30.2 MMHG
BH CV ECHO MEAS - SV(AO): 268.9 ML
BH CV ECHO MEAS - SV(CUBED): 83 ML
BH CV ECHO MEAS - SV(LVOT): 61.3 ML
BH CV ECHO MEAS - SV(MOD-SP4): 102.3 ML
BH CV ECHO MEAS - SV(TEICH): 65.8 ML
BH CV ECHO MEAS - TR MAX VEL: 251 CM/SEC
LEFT ATRIUM VOLUME: 96.2 CM3
LV EF 2D ECHO EST: 60 %

## 2017-01-08 PROCEDURE — 99238 HOSP IP/OBS DSCHRG MGMT 30/<: CPT | Performed by: INTERNAL MEDICINE

## 2017-01-08 RX ORDER — COLCHICINE 0.6 MG/1
0.6 TABLET ORAL 2 TIMES DAILY
Qty: 60 TABLET | Refills: 0 | Status: SHIPPED | OUTPATIENT
Start: 2017-01-08 | End: 2018-04-23

## 2017-01-08 RX ADMIN — COLCHICINE 0.6 MG: 0.6 TABLET, FILM COATED ORAL at 09:19

## 2017-01-08 RX ADMIN — ASPIRIN 81 MG 81 MG: 81 TABLET ORAL at 09:19

## 2017-01-08 RX ADMIN — LISINOPRIL 5 MG: 5 TABLET ORAL at 09:19

## 2017-01-08 RX ADMIN — PRASUGREL HYDROCHLORIDE 10 MG: 10 TABLET, FILM COATED ORAL at 09:19

## 2017-01-08 NOTE — DISCHARGE INSTRUCTIONS
1. Take all medications as prescribed- Colchicine x1 month.   2. Monitor heart rate and blood pressure daily at home and bring readings to follow up  3. Call with any questions or concerns

## 2017-01-08 NOTE — PLAN OF CARE
Problem: Patient Care Overview (Adult)  Goal: Plan of Care Review  Outcome: Ongoing (interventions implemented as appropriate)    01/08/17 0358   Coping/Psychosocial Response Interventions   Plan Of Care Reviewed With patient;spouse   Patient Care Overview   Progress improving   Outcome Evaluation   Outcome Summary/Follow up Plan No complaints of chest pain. Patient slept well. No AM labs or tests this AM. Echo showed improvement s/p MI witth ef estimated 60%       Goal: Adult Individualization and Mutuality  Outcome: Ongoing (interventions implemented as appropriate)  Goal: Discharge Needs Assessment  Outcome: Ongoing (interventions implemented as appropriate)    Problem: Acute Coronary Syndrome (ACS) (Adult)  Goal: Signs and Symptoms of Listed Potential Problems Will be Absent or Manageable (Acute Coronary Syndrome)  Outcome: Ongoing (interventions implemented as appropriate)

## 2017-01-08 NOTE — DISCHARGE SUMMARY
[facility] HEART GROUP DISCHARGE    Date of Admission: 1/6/2017  Date of Discharge:  1/8/2017    Attending: Dr. Ronaldo Gutierrez     Discharge Diagnosis:   Principal Problem:    Post-MI pericarditis    Active Problems:    Mobitz type 2 second degree heart block, intermittent.     Coronary artery disease involving native coronary artery of native heart without angina pectoris    Mixed hyperlipidemia    Essential hypertension    Obesity      Presenting Problem/History of Present Illness  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]      Hospital Course  David Sandifer is a 46 y.o. male with a known PMH significant for CAD s/p STEMI with DELIA to the proximal LAD (12/25/16). Patient presented to [facility] via ED with complaints of chest pain. Patient reports that he has been doing well since his MI 2 weeks ago and taking his medications as prescribed. The evening prior to presentation, he was coughing, when he had a sudden onset of midsternal chest pain associated with taking deep breaths and coughing, non exertional. Pain improved throughout the night. Patient admitted under the care of Dr. Gutierrez to  telemetry. He underwent Echo (see results below). On telemetry, patient was noted to have intermittent 2nd Degree type I while asleep- he was asymptomatic. He was started on Colchicine and was ready for discharge on 1.8.17 with plans for close PCP follow up.     Procedures Performed  · 1.7.17- Echo: Left ventricular function is low normal. Estimated EF = 60%.  · The following segments are hypokinetic: apical septal, apical lateral and apex.  · No pulmonary hypertension                     No pericardial effusion         Consults:   Consults     Date and Time Order Name Status Description    1/6/2017 2229 Consult Interventional Cardiology and Notify Cath Lab      12/25/2016 1606 IP Consult to Cardiology Completed           Pertinent Test Results:   Lab Results   Component Value Date    WBC 8.58 01/06/2017    HGB 14.9 01/06/2017    HCT  42.4 01/06/2017    MCV 87.6 01/06/2017     01/06/2017     Lab Results   Component Value Date    GLUCOSE 116 (H) 01/06/2017    CALCIUM 9.2 01/06/2017     01/06/2017    K 4.4 01/06/2017    CO2 25.0 01/06/2017    CL 98 01/06/2017    BUN 20 01/06/2017    CREATININE 0.86 01/06/2017    EGFRIFAFRI 116 01/06/2017    EGFRIFNONA 96 01/06/2017    BCR 23.3 01/06/2017    ANIONGAP 12.0 01/06/2017       Condition on Discharge:  Stable     Physical Exam at Discharge  Patient Vitals for the past 24 hrs:   BP Temp Temp src Pulse Resp SpO2 Weight   01/08/17 0748 117/73 97.3 °F (36.3 °C) Temporal Art 76 20 96 % -   01/08/17 0346 101/60 97.5 °F (36.4 °C) Temporal Art 77 18 97 % -   01/07/17 2332 103/61 97.3 °F (36.3 °C) Temporal Art 82 18 99 % -   01/07/17 2102 104/58 98.5 °F (36.9 °C) Temporal Art 78 18 97 % 244 lb 3 oz (111 kg)   01/07/17 1610 90/46 98.3 °F (36.8 °C) Temporal Art 72 20 97 % -   01/07/17 1151 97/60 98 °F (36.7 °C) Temporal Art 75 18 95 % -     Physical Exam    Discharge Disposition  Home or Self Care    Discharge Medications   Sandifer, David   Home Medication Instructions YVONNE:[id number]    Printed on:01/08/17 1146   Medication Information                      aspirin 81 MG chewable tablet  Chew 1 tablet Daily.             atorvastatin (LIPITOR) 80 MG tablet  Take 1 tablet by mouth Every Night.             colchicine 0.6 MG tablet  Take 1 tablet by mouth 2 (Two) Times a Day.             lisinopril (PRINIVIL,ZESTRIL) 5 MG tablet  Take 1 tablet by mouth Daily.             metoprolol tartrate (LOPRESSOR) 50 MG tablet  Take 1 tablet by mouth Every 12 (Twelve) Hours.             prasugrel (EFFIENT) 10 MG tablet  Take 1 tablet by mouth Daily.                 Education:   1. Take all medications as prescribed.  Colchicine x1 month.   2. Monitor heart rate and blood pressure daily at home and bring readings to follow up  3. Call with any questions or concerns     Discharge Diet: Healthy Heart Diet     Activity  at Discharge: Resume normal activity as tolerated.     Follow-up Appointments  Future Appointments  Date Time Provider Department Center   4/7/2017 9:00 AM MD KIERAN Danielle [facility] None          Tara Fong, SANDOR  01/08/17  11:46 AM    Time: Discharge 30 min

## 2017-01-09 NOTE — PAYOR COMM NOTE
"ADMIT INPT TO TELEMETRY 1-6-17  DC HOME 1-8-17        Sandifer, David (46 y.o. Male)     Date of Birth Social Security Number Address Home Phone MRN    1970  [address] [place] [id number] [phone] [id number]    [place] Marital Status          None        Admission Date Admission Type Admitting Provider Attending Provider Department, Room/Bed    1/6/17 Emergency Ronaldo Gutierrez MD  [facility] 4B, 443/1    Discharge Date Discharge Disposition Discharge Destination        1/8/2017 Home or Self Care Home            Attending Provider: (none)    Allergies:  No Known Allergies    Isolation:  None   Infection:  None   Code Status:  Prior    Ht:  72\" (182.9 cm)   Wt:  244 lb 3 oz (111 kg)    Admission Cmt:  None   Principal Problem:  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]                 Active Insurance as of 1/6/2017     Primary Coverage     Payor Plan Insurance Group Employer/Plan Group    [facility]WAYS PAR [id number]     Payor Plan Address Payor Plan Phone Number Effective From Effective To    [address] [phone] 9/4/2016     [place] [address]       Subscriber Name Subscriber Birth Date Member ID       SANDIFER,DAVID 1970 [id number]                 Emergency Contacts      (Rel.) Home Phone Work Phone Mobile Phone    Sandifer,Karla (Spouse) [phone] -- [phone]               History & Physical      SANDOR Osuna at 1/7/2017  7:46 AM     Attestation signed by Ronaldo Gutierrez MD at 1/7/2017 10:24 PM        I have seen and evaluated this patient personally. I agree with the findings, assessment and plan as outlined by mid level provider. In addition, I have the following to add.    Face to face encounter     LOS: 1 day   Patient Care Team:  No Known Provider as PCP - General    Chief Complaint: Chest pain  Occurs when lying flat    Interval History: Improved overall    Patient Complaints:  Chief Complaint   Patient presents with "   • [facility] transfer     Denies chest pain currently. Denies excessive shortness of breath. Denies abdominal pain, nausea vomiting or diarrhoea.    Telemetry: no malignant arrhythmia. No significant pauses.    History taken from: Patient and chart  Review of Systems:    The following systems were reviewed and negative; ENT, respiratory,  gastrointestinal, integument, hematologic / lymphatic, neurological, behavioral/psych and allergies / immunologic    Labs:    WBC WBC   Date Value Ref Range Status   01/06/2017 8.58 4.80 - 10.80 10*3/mm3 Final      HGB HEMOGLOBIN   Date Value Ref Range Status   01/06/2017 14.9 14.0 - 18.0 g/dL Final      HCT HEMATOCRIT   Date Value Ref Range Status   01/06/2017 42.4 40.0 - 52.0 % Final      Platlets PLATELETS   Date Value Ref Range Status   01/06/2017 369 130 - 400 10*3/mm3 Final      MCV MCV   Date Value Ref Range Status   01/06/2017 87.6 82.0 - 95.0 fL Final        Results from last 7 days  Lab Units 01/06/17  2239   SODIUM mmol/L 135   POTASSIUM mmol/L 4.4   CHLORIDE mmol/L 98   TOTAL CO2 mmol/L 25.0   BUN mg/dL 20   CREATININE mg/dL 0.86   CALCIUM mg/dL 9.2   BILIRUBIN mg/dL 0.5   ALK PHOS U/L 73   ALT (SGPT) U/L 94*   AST (SGOT) U/L 54*   GLUCOSE mg/dL 116*     Lab Results   Component Value Date    TROPONINI 112.000 (C) 12/26/2016     PT/INR:    PROTIME   Date Value Ref Range Status   01/06/2017 12.9 11.9 - 14.6 Seconds Final   /  INR   Date Value Ref Range Status   01/06/2017 0.94 0.91 - 1.09 Final       Imaging Results (last 72 hours)     Procedure Component Value Units Date/Time    XR Chest 1 View [[id number]] Collected:  01/07/17 0907     Updated:  01/07/17 0958    Narrative:       EXAMINATION: XR CHEST 1 VW- 1/7/2017 9:00 CST     HISTORY: Acute STEMI triage protocol     COMPARISON: None     FINDINGS:  Heart is magnified but felt to be normal in size. Aorta mildly tortuous.  No focal consolidation or effusion. No pneumothorax. Normal pulmonary  vasculature.        "Impression:       No acute findings.  This report was finalized on 01/07/2017 09:56 by Dr. Jose Rafael Hudson MD.          Objective     Medication Review:   Current Facility-Administered Medications   Medication Dose Route Frequency Provider Last Rate Last Dose   • aspirin chewable tablet 81 mg  81 mg Oral Daily Tara Fong, APRN   81 mg at 01/07/17 1024   • colchicine tablet 0.6 mg  0.6 mg Oral Daily Catalina South MD   0.6 mg at 01/06/17 2330   • enoxaparin (LOVENOX) syringe 40 mg  40 mg Subcutaneous Q24H Tara Fong, APRN   40 mg at 01/07/17 1024   • Influenza Vac Subunit Quad (FLUCELVAX) injection 0.5 mL  0.5 mL Intramuscular During Hospitalization Ronaldo Gutierrez MD       • lisinopril (PRINIVIL,ZESTRIL) tablet 5 mg  5 mg Oral Q24H Tara Fong, APRN   5 mg at 01/07/17 1024   • prasugrel (EFFIENT) tablet 10 mg  10 mg Oral Daily Tara Fong APRN   10 mg at 01/07/17 1025   • sodium chloride 0.9 % flush 10 mL  10 mL Intravenous PRN Catalina South MD           Vital Sign Min/Max for last 24 hours  Temp  Min: 97.5 °F (36.4 °C)  Max: 98.5 °F (36.9 °C)   BP  Min: 90/46  Max: 138/83   Pulse  Min: 70  Max: 80   Resp  Min: 18  Max: 20   SpO2  Min: 93 %  Max: 99 %   Flow (L/min)  Min: 2  Max: 3   Weight  Min: 244 lb 3 oz (111 kg)  Max: 244 lb 9 oz (111 kg)     Flowsheet Rows         First Filed Value    Admission Height  72\" (182.9 cm) Documented at 01/06/2017 2220    Admission Weight  250 lb (113 kg) Documented at 01/06/2017 2220          Physical Exam:  Ears ears appear intact with no abnormalities noted  Nose nares normal, septum midline, mucosa normal and no drainage  Neck suppple, trachea midline, no thyromegaly, no carotid bruit and no JVD  Back no kyphosis present, no scoliosis present, no skin lesions, erythema, or scars, no tenderness to percussion or palpation and range of motion normal  Lungs respirations regular, respirations even and respirations unlabored  Heart normal S1, S2, no murmur, no " lm, no rub and no click  Abdomen normal bowel sounds, no masses, no hepatomegaly, no splenomegaly, no guarding and no rebound tenderness  Skin no bleeding, bruising or rash     Results Review:   I reviewed the patient's new clinical results.  I reviewed the patient's new imaging results and agree with the interpretation.  I reviewed the patient's other test results and agree with the interpretation  I personally viewed and interpreted the patient's EKG/Telemetry data  Discussed with patient    Medication Review: Performed  Agree with assessment and plan of mid level provider as below with any changes if made as noted    Assessment/Plan   Post-MI pericarditis     Active Problems:  -Mobitz type 2 second degree heart block, intermittent. No further events overnight.    -Coronary artery disease involving native coronary artery of native heart without angina pectoris s/p STEMI with DELIA to the proximal LAD 12/25/16    -Mixed hyperlipidemia  -Essential hypertension- controlled    -Obesity        Plan     Monitor telemetry  Colchicine 0.6mg POBID  Restart home medications- ASA, Effient, Lipitor, Lisinopril    Hold BB due to intermittent 2nd Degree block  VTE with Lovenox    Obtain Echo   Healthy Heart Diet   Supportive care  Telemetry  Optimal medical therapy  Deep vein thrombosis prophylaxis/precautions  Counseled regarding disease appropriate diet, fluid, caffeine,stimulant and sodium intake     Ronaldo Gutierrez MD  01/07/17  10:22 PM                                     [facility]ART GROUP HISTORY AND PHYSICAL      Patient Care Team:  No Known Provider as PCP - General    Chief complaint: Chest Pain     Subjective     David Sandifer is a 46 y.o. male with a known PMH significant for CAD s/p STEMI with DELIA to the proximal LAD (12/25/16). Patient presented to [facility] via ED with complaints of chest pain. Patient reports that he has been doing well since his MI 2 weeks ago and taking his medications as prescribed.  "Yesterday evening, he was coughing, when he had a sudden onset of midsternal chest pain associated with taking deep breaths and coughing, non exertional. Pain has improved overnight. Denies any additional complaints at this time.     Review of Systems  Review of Systems   Constitution: Negative for chills, diaphoresis, fever, weakness and malaise/fatigue.   HENT: Negative for stridor.    Cardiovascular: Positive for chest pain and dyspnea on exertion (\"Improving\"). Negative for irregular heartbeat, leg swelling, near-syncope, orthopnea, palpitations, paroxysmal nocturnal dyspnea and syncope.   Respiratory: Positive for cough. Negative for hemoptysis, sputum production and wheezing.    Skin: Negative for rash.   Musculoskeletal: Negative for falls.   Gastrointestinal: Negative for bloating, abdominal pain, nausea and vomiting.   Neurological: Negative for dizziness and focal weakness.   Psychiatric/Behavioral: Negative for altered mental status.        History  Past Medical History   Diagnosis Date   • Coronary artery disease    • Hypertension    • Myocardial infarction 12/25/2016     STEMI s/p DELIA LAD      Past Surgical History   Procedure Laterality Date   • Cardiac catheterization N/A 12/25/2016     Procedure: Left Heart Cath;  Surgeon: Jose Rasmussen MD;  Location:  [facility] CATH INVASIVE LOCATION;  Service:    • Coronary stent placement  12/25/2016     LAD      History reviewed. No pertinent family history.  Social History   Substance Use Topics   • Smoking status: Former Smoker   • Smokeless tobacco: None   • Alcohol use 1.2 oz/week     2 Cans of beer per week       Medications  Prior to Admission medications    Medication Sig Start Date End Date Taking? Authorizing Provider   aspirin 81 MG chewable tablet Chew 1 tablet Daily. 12/28/16   Jessica Phelan PA-C   atorvastatin (LIPITOR) 80 MG tablet Take 1 tablet by mouth Every Night. 12/28/16   Jessica Phelan PA-C   lisinopril (PRINIVIL,ZESTRIL) 5 MG tablet Take 1 " tablet by mouth Daily. 12/28/16   Jessica Phelan PA-C   metoprolol tartrate (LOPRESSOR) 50 MG tablet Take 1 tablet by mouth Every 12 (Twelve) Hours. 12/28/16   Jessica Phelan PA-C   prasugrel (EFFIENT) 10 MG tablet Take 1 tablet by mouth Daily. 12/28/16   Jessica Phelan PA-C       Current Facility-Administered Medications   Medication Dose Route Frequency Provider Last Rate Last Dose   • aspirin chewable tablet 81 mg  81 mg Oral Daily SANDOR Osuna       • colchicine tablet 0.6 mg  0.6 mg Oral Daily Catalina South MD   0.6 mg at 01/06/17 2330   • enoxaparin (LOVENOX) syringe 40 mg  40 mg Subcutaneous Q24H SANDOR Osuna       • Influenza Vac Subunit Quad (FLUCELVAX) injection 0.5 mL  0.5 mL Intramuscular During Hospitalization Ronaldo Gutierrez MD       • lisinopril (PRINIVIL,ZESTRIL) tablet 5 mg  5 mg Oral Q24H SANDOR Osuna       • prasugrel (EFFIENT) tablet 10 mg  10 mg Oral Daily SANDOR Osuna       • sodium chloride 0.9 % flush 10 mL  10 mL Intravenous PRN Catalina South MD            Allergies:  Review of patient's allergies indicates no known allergies.    Objective     Vital Signs  Temp:  [97.5 °F (36.4 °C)-98 °F (36.7 °C)] 97.8 °F (36.6 °C)  Heart Rate:  [70-80] 71  Resp:  [18-20] 20  BP: (101-138)/(66-83) 107/66    Labs  Lab Results   Component Value Date    WBC 8.58 01/06/2017    HGB 14.9 01/06/2017    HCT 42.4 01/06/2017    MCV 87.6 01/06/2017     01/06/2017     Lab Results   Component Value Date    GLUCOSE 116 (H) 01/06/2017    CALCIUM 9.2 01/06/2017     01/06/2017    K 4.4 01/06/2017    CO2 25.0 01/06/2017    CL 98 01/06/2017    BUN 20 01/06/2017    CREATININE 0.86 01/06/2017    EGFRIFAFRI 116 01/06/2017    EGFRIFNONA 96 01/06/2017    BCR 23.3 01/06/2017    ANIONGAP 12.0 01/06/2017       Physical Exam:  Physical Exam   Constitutional: He is oriented to person, place, and time. Vital signs are normal. He appears well-developed and well-nourished. He is  cooperative. No distress.   Sitting on the side of the bed, wife at bedside.    HENT:   Head: Normocephalic and atraumatic.   Cardiovascular: Normal rate, regular rhythm, S1 normal, S2 normal, normal heart sounds, intact distal pulses and normal pulses.    No murmur heard.  Pulmonary/Chest: Effort normal and breath sounds normal.   Nontender to palpation    Abdominal: Soft. Normal appearance and bowel sounds are normal. There is no tenderness.   Musculoskeletal: He exhibits no edema.   Neurological: He is alert and oriented to person, place, and time.   Skin: Skin is warm and dry. No rash noted. He is not diaphoretic.   Psychiatric: He has a normal mood and affect. His speech is normal and behavior is normal.   Vitals reviewed.      Results Review:    I reviewed the patient's new clinical results.  I personally viewed and interpreted the patient's EKG/Telemetry data    Assessment     Principal Problem:    -Post-MI pericarditis    Active Problems:    -Mobitz type 2 second degree heart block, intermittent. No further events overnight.     -Coronary artery disease involving native coronary artery of native heart without angina pectoris s/p STEMI with DELIA to the proximal LAD 12/25/16     -Mixed hyperlipidemia    -Essential hypertension- controlled     -Obesity      Plan   1. Monitor telemetry  2. Colchicine 0.6mg PO daily- consider increasing to BID   3. Restart home medications- ASA, Effient, Lipitor, Lisinopril   4. Hold BB due to intermittent 2nd Degree block  5. VTE with Lovenox   6. Obtain Echo  7. Healthy Heart Diet     I discussed the patients findings and my recommendations with patient and nursing staff. Further recommendations per Dr. Gutierrez upon his evaluation of the patient.     SANDOR Osuna  01/07/17  8:25 AM      Please note this cardiology history and physical note is the result of a face to face consultation with the patient, in addition to reviewing medical records at length by myself, Tara  MANDIE Fong, SANDOR.     Time: More than 50% of time spent in counseling and coordination of care:  Total face-to-face/floor time 35 min.  Time spent in counseling 20 min. Counseling included the following topics: lab results, ECG results, assessment, plan of care         Electronically signed by Ronaldo Gutierrez MD at 1/7/2017 10:24 PM           Emergency Department Notes      Michelle Lau, RN at 1/6/2017 10:29 PM          Code STEMI called at this time     Michelle Lau RN  01/06/17 2231       Electronically signed by Michelle Lau RN at 1/6/2017 10:31 PM        Vital Signs (last 72 hrs)       01/06 0700  -  01/07 0659 01/07 0700  -  01/08 0659 01/08 0700  -  01/09 0659 01/09 0700  -  01/09 0926   Most Recent    Temp (°F) 97.5 -  98    97.3 -  98.5    97.3 -  98       98 (36.7)    Heart Rate 70 -  80    71 -  82    76 -  89       89    Resp 18 -  20    18 -  20    18 -  20       18    /70 -  138/83    90/46 -  107/66    114/62 -  117/73       114/62    SpO2 (%) 93 -  99    94 -  99      96       96          Intake & Output (last 3 days)       01/06 0701 - 01/07 0700 01/07 0701 - 01/08 0700 01/08 0701 - 01/09 0700 01/09 0701 - 01/10 0700    P.O. 360 420      Total Intake(mL/kg) 360 (3.2) 420 (3.8)      Urine (mL/kg/hr)  300 (0.1)      Total Output   300        Net +360 +120                Unmeasured Urine Occurrence 1 x 3 x          Hospital Medications (all)       Dose Frequency Start End    atorvastatin (LIPITOR) tablet 80 mg 80 mg Nightly 1/6/2017 1/7/2017    Sig - Route: Take 2 tablets by mouth Every Night. - Oral    Cosign for Ordering: Required by Catalina South MD    metoprolol tartrate (LOPRESSOR) tablet 50 mg 50 mg Once 1/6/2017 1/7/2017    Sig - Route: Take 1 tablet by mouth 1 (One) Time. - Oral    Cosign for Ordering: Required by Catalina South MD    Morphine sulfate (PF) injection 4 mg 4 mg Once 1/6/2017 1/6/2017    Sig - Route: Infuse 1 mL into a venous catheter 1 (One) Time. - Intravenous     ondansetron (ZOFRAN) injection 4 mg 4 mg Once 1/6/2017 1/6/2017    Sig - Route: Infuse 2 mL into a venous catheter 1 (One) Time. - Intravenous    perflutren (DEFINITY) 8.476 mg in sodium chloride 10 mL injection 5 mL Once 1/7/2017 1/7/2017    Sig - Route: Infuse 5 mL into a venous catheter 1 (One) Time. - Intravenous    aspirin chewable tablet 81 mg (Discontinued) 81 mg Daily 1/7/2017 1/8/2017    Sig - Route: Chew 1 tablet Daily. - Oral    Reason for Discontinue: Patient Discharge    colchicine tablet 0.6 mg (Discontinued) 0.6 mg Daily 1/6/2017 1/8/2017    Sig - Route: Take 1 tablet by mouth Daily. - Oral    Reason for Discontinue: Patient Discharge    enoxaparin (LOVENOX) syringe 40 mg (Discontinued) 40 mg Every 24 Hours 1/7/2017 1/8/2017    Sig - Route: Inject 0.4 mL under the skin Daily. - Subcutaneous    Reason for Discontinue: Patient Discharge    Influenza Vac Subunit Quad (FLUCELVAX) injection 0.5 mL (Discontinued) 0.5 mL During Hospitalization 1/7/2017 1/8/2017    Sig - Route: Inject 0.5 mL into the shoulder, thigh, or buttocks During Hospitalization for immunization. - Intramuscular    Reason for Discontinue: Patient Discharge    lisinopril (PRINIVIL,ZESTRIL) tablet 5 mg (Discontinued) 5 mg Every 24 Hours Scheduled 1/7/2017 1/8/2017    Sig - Route: Take 1 tablet by mouth Daily. - Oral    Reason for Discontinue: Patient Discharge    prasugrel (EFFIENT) tablet 10 mg (Discontinued) 10 mg Daily 1/7/2017 1/8/2017    Sig - Route: Take 1 tablet by mouth Daily. - Oral    Reason for Discontinue: Patient Discharge    sodium chloride 0.9 % flush 10 mL (Discontinued) 10 mL As Needed 1/6/2017 1/8/2017    Sig - Route: Infuse 10 mL into a venous catheter As Needed for line care. - Intravenous    Reason for Discontinue: Patient Discharge    Cosign for Ordering: Required by Catalina South MD          Lab Results (last 72 hours)     Procedure Component Value Units Date/Time    CBC & Differential [[id number]] Collected:   01/06/17 2239    Specimen:  Blood Updated:  01/06/17 2246    Narrative:       The following orders were created for panel order CBC & Differential.  Procedure                               Abnormality         Status                     ---------                               -----------         ------                     CBC Auto Differential[[id number]]         Abnormal            Final result                 Please view results for these tests on the individual orders.    CBC Auto Differential [[id number]]  (Abnormal) Collected:  01/06/17 2239    Specimen:  Blood Updated:  01/06/17 2246     WBC 8.58 10*3/mm3      RBC 4.84 10*6/mm3      Hemoglobin 14.9 g/dL      Hematocrit 42.4 %      MCV 87.6 fL      MCH 30.8 pg      MCHC 35.1 g/dL      RDW 12.7 %      RDW-SD 41.0 fl      MPV 10.1 fL      Platelets 369 10*3/mm3      Neutrophil % 55.4 %      Lymphocyte % 31.7 %      Monocyte % 7.8 %      Eosinophil % 4.1 (H) %      Basophil % 0.5 %      Immature Grans % 0.5 %      Neutrophils, Absolute 4.76 10*3/mm3      Lymphocytes, Absolute 2.72 10*3/mm3      Monocytes, Absolute 0.67 10*3/mm3      Eosinophils, Absolute 0.35 10*3/mm3      Basophils, Absolute 0.04 10*3/mm3      Immature Grans, Absolute 0.04 (H) 10*3/mm3     POC Troponin, Rapid [[id number]]  (Abnormal) Collected:  01/06/17 2233    Specimen:  Blood Updated:  01/06/17 2250     Troponin I 0.26 (H) ng/mL       Serial Number: [id number]    : [id number]       Protime-INR [[id number]]  (Normal) Collected:  01/06/17 2239    Specimen:  Blood Updated:  01/06/17 2256     Protime 12.9 Seconds      INR 0.94     Comprehensive Metabolic Panel [[id number]]  (Abnormal) Collected:  01/06/17 2239    Specimen:  Blood Updated:  01/06/17 2305     Glucose 116 (H) mg/dL      BUN 20 mg/dL      Creatinine 0.86 mg/dL      Sodium 135 mmol/L      Potassium 4.4 mmol/L      Chloride 98 mmol/L      CO2 25.0 mmol/L      Calcium 9.2 mg/dL      Total Protein 7.5 g/dL      Albumin 4.20 g/dL      ALT (SGPT) 94  (H) U/L      AST (SGOT) 54 (H) U/L      Alkaline Phosphatase 73 U/L      Total Bilirubin 0.5 mg/dL      eGFR Non African Amer 96 mL/min/1.73      eGFR  African Amer 116 mL/min/1.73      Globulin 3.3 gm/dL      A/G Ratio 1.3 g/dL      BUN/Creatinine Ratio 23.3      Anion Gap 12.0 mmol/L     [place] Draw [[id number]] Collected:  01/06/17 2239    Specimen:  Blood Updated:  01/07/17 0301    Narrative:       The following orders were created for panel order [place] Draw.  Procedure                               Abnormality         Status                     ---------                               -----------         ------                     Light Blue Top[[id number]]                                    Final result               Green Top (Gel)[[id number]]                                   Final result               Lavender Top[[id number]]                                      Final result               Red Top[[id number]]                                           Final result               Green Top (No Gel)[[id number]]                                                             Please view results for these tests on the individual orders.    Light Blue Top [[id number]] Collected:  01/06/17 2239    Specimen:  Blood Updated:  01/07/17 0301     Extra Tube hold for add-on       Auto resulted       Green Top (Gel) [[id number]] Collected:  01/06/17 2239    Specimen:  Blood Updated:  01/07/17 0301     Extra Tube Hold for add-ons.       Auto resulted.       Lavender Top [[id number]] Collected:  01/06/17 2239    Specimen:  Blood Updated:  01/07/17 0301     Extra Tube hold for add-on       Auto resulted       Red Top [[id number]] Collected:  01/06/17 2239    Specimen:  Blood Updated:  01/07/17 0301     Extra Tube Hold for add-ons.       Auto resulted.             Imaging Results (last 72 hours)     Procedure Component Value Units Date/Time    XR Chest 1 View [[id number]] Collected:  01/07/17 0907     Updated:  01/07/17 0958    Narrative:        EXAMINATION: XR CHEST 1 VW- 1/7/2017 9:00 CST     HISTORY: Acute STEMI triage protocol     COMPARISON: None     FINDINGS:  Heart is magnified but felt to be normal in size. Aorta mildly tortuous.  No focal consolidation or effusion. No pneumothorax. Normal pulmonary  vasculature.       Impression:       No acute findings.  This report was finalized on 01/07/2017 09:56 by Dr. Jose Rafael Hudson MD.          ECG/EMG Results (last 72 hours)     Procedure Component Value Units Date/Time    Adult Transthoracic Echo Complete With Contrast [[id number]] Collected:  01/07/17 1447     IVSd 1.2 cm Updated:  01/08/17 0108     LVIDd 5.2 cm      LVIDs 3.8 cm      LVPWd 1.2 cm      IVS/LVPW 1.0      FS 26.8 %      EDV(Teich) 126.6 ml      ESV(Teich) 60.8 ml      EF(Teich) 52.0 %      EDV(cubed) 136.6 ml      ESV(cubed) 53.6 ml      EF(cubed) 60.8 %      LV mass(C)d 253.6 grams      SV(Teich) 65.8 ml      SV(cubed) 83.0 ml      Ao root diam 4.0 cm      Ao root area 12.6 cm^2      LA dimension 3.7 cm      LA/Ao 0.93      LVOT diam 2.0 cm      LVOT area 3.1 cm^2      LVOT area(traced) 3.1 cm^2      LVLd ap4 10.8 cm      EDV(MOD-sp4) 190.0 ml      LVLs ap4 8.9 cm      ESV(MOD-sp4) 87.7 ml      EF(MOD-sp4) 53.8 %      SV(MOD-sp4) 102.3 ml      CONTRAST EF 4CH 53.8 ml/m^2      MV E max pawan 102.0 cm/sec      MV A max pawan 55.1 cm/sec      MV E/A 1.9      MV dec time 0.19 sec      Ao pk pawan 124.0 cm/sec      Ao max PG 6.2 mmHg      Ao max PG (full) 1.7 mmHg      Ao V2 mean 85.0 cm/sec      Ao mean PG 3.0 mmHg      Ao mean PG (full) 1.0 mmHg      Ao V2 VTI 21.4 cm      ROMY(I,A) 2.9 cm^2      ROMY(I,D) 2.9 cm^2      ORMY(V,A) 2.7 cm^2      ROMY(V,D) 2.7 cm^2      LV V1 max PG 4.5 mmHg      LV V1 mean PG 2.0 mmHg      LV V1 max 106.0 cm/sec      LV V1 mean 67.2 cm/sec      LV V1 VTI 19.5 cm      SV(Ao) 268.9 ml      SV(LVOT) 61.3 ml      TR max pawan 251.0 cm/sec      RVSP(TR) 30.2 mmHg      RAP systole 5.0 mmHg      LA volume 96.2 cm3      Lat Peak E'  [place] 9.5 cm/sec      Echo EF Estimated 60 %     Narrative:       · Left ventricular function is low normal. Estimated EF = 60%.  · The following segments are hypokinetic: apical septal, apical lateral   and apex.  · No pulmonary hypertension  · No pericardial effusion       ECG 12 Lead [[id number]] Collected:  01/06/17 2220     Updated:  01/08/17 0140    Narrative:       Test Reason : chest pain  Blood Pressure : **/** mmHG  Vent. Rate : 076 BPM     Atrial Rate : 076 BPM     P-R Int : 240 ms          QRS Dur : 096 ms      QT Int : 406 ms       P-R-T Axes : 025 018 102 degrees     QTc Int : 456 ms    Sinus rhythm with 1st degree A-V block  Low voltage QRS, consider pulmonary disease, pericardial effusion, or  normal variant  Anteroseptal infarct (cited on or before 25-DEC-2016)  T wave abnormality, consider lateral ischemia  ** ** ** ** * ACUTE MI * ** ** ** **  Abnormal ECG  When compared with ECG of 26-DEC-2016 10:13,  OR interval has increased  Questionable change in initial forces of Lateral leads  ST no longer elevated in Lateral leads  T wave inversion now evident in Anterior-lateral leads    Referred By:  ZENOBIA           Confirmed By:Ronaldo Gutierrez MD          Orders (last 72 hrs)     Start     Ordered    01/08/17 1146  Discharge patient  Once      01/08/17 1145    01/08/17 0000  colchicine 0.6 MG tablet  2 Times Daily      01/08/17 1145    01/07/17 1615  perflutren (DEFINITY) 8.476 mg in sodium chloride 10 mL injection  Once      01/07/17 1542    01/07/17 0912  Diet Regular; Cardiac  Diet Effective Now,   Status:  Canceled      01/07/17 0912    01/07/17 0900  aspirin chewable tablet 81 mg  Daily,   Status:  Discontinued      01/07/17 0807    01/07/17 0900  lisinopril (PRINIVIL,ZESTRIL) tablet 5 mg  Every 24 Hours Scheduled,   Status:  Discontinued      01/07/17 0807    01/07/17 0900  prasugrel (EFFIENT) tablet 10 mg  Daily,   Status:  Discontinued      01/07/17 0807    01/07/17 0845  enoxaparin (LOVENOX) syringe  40 mg  Every 24 Hours,   Status:  Discontinued      01/07/17 0808    01/07/17 0825  Adult Transthoracic Echo Complete With Contrast  Once     Comments:  Rule out Pericarditis    01/07/17 0824    01/07/17 0820  Adult Transthoracic Echo Complete With Contrast  Once,   Status:  Canceled      01/07/17 0819    01/07/17 0049  Influenza Vac Subunit Quad (FLUCELVAX) injection 0.5 mL  During Hospitalization,   Status:  Discontinued      01/07/17 0050    01/06/17 2342  metoprolol tartrate (LOPRESSOR) tablet 50 mg  Once      01/06/17 2340    01/06/17 2342  atorvastatin (LIPITOR) tablet 80 mg  Nightly      01/06/17 2340    01/06/17 2328  Inpatient Admission  Once      01/06/17 2329    01/06/17 2301  colchicine tablet 0.6 mg  Daily,   Status:  Discontinued      01/06/17 2248    01/06/17 2251  POC Troponin, Rapid  Once      01/06/17 2250    01/06/17 2231  Morphine sulfate (PF) injection 4 mg  Once      01/06/17 2229 01/06/17 2231  ondansetron (ZOFRAN) injection 4 mg  Once      01/06/17 2229    01/06/17 2230  Initiate Department's Acute STEMI Process  Once,   Status:  Canceled      01/06/17 2229 01/06/17 2230  Consult Interventional Cardiology and Notify Cath Lab  Once,   Status:  Canceled     Specialty:  Interventional Cardiology  Provider:  (Not yet assigned)    01/06/17 2229 01/06/17 2230  NPO Diet  Diet Effective Now,   Status:  Canceled      01/06/17 2229 01/06/17 2230  Undress and Gown  Once,   Status:  Canceled      01/06/17 2229 01/06/17 2230  Cardiac monitoring  Per Hospital Policy,   Status:  Canceled      01/06/17 2229 01/06/17 2230  Continuous Pulse Oximetry  Per Hospital Policy,   Status:  Canceled      01/06/17 2229    01/06/17 2230  Oxygen Therapy- Nasal Cannula; 2 LPM; Titrate for spO2: equal to or greater than, 92%  Continuous,   Status:  Canceled      01/06/17 2229    01/06/17 2230  Vital Signs  Per Hospital Policy,   Status:  Canceled     Comments:  Every 5-15 mninutes    01/06/17 7165     01/06/17 2230  ECG 12 Lead  Once      01/06/17 2229    01/06/17 2230  XR Chest 1 View  1 Time Imaging      01/06/17 2229    01/06/17 2230  Insert peripheral IV  Once,   Status:  Canceled     Comments:  Avoid right radial area if possible.    01/06/17 2229    01/06/17 2230  Insert 2nd peripheral IV  Once,   Status:  Canceled     Comments:  Avoid right radial area if possible.    01/06/17 2229    01/06/17 2230  Divide Draw  Once      01/06/17 2229    01/06/17 2230  CBC & Differential  Once      01/06/17 2229    01/06/17 2230  POCT Troponin, Rapid  Once,   Status:  Canceled      01/06/17 2229    01/06/17 2230  Comprehensive Metabolic Panel  Once      01/06/17 2229    01/06/17 2230  Protime-INR  Once      01/06/17 2229    01/06/17 2230  Light Blue Top  PROCEDURE ONCE      01/06/17 2229    01/06/17 2230  Green Top (Gel)  PROCEDURE ONCE      01/06/17 2229    01/06/17 2230  Lavender Top  PROCEDURE ONCE      01/06/17 2229    01/06/17 2230  Red Top  PROCEDURE ONCE      01/06/17 2229    01/06/17 2230  Green Top (No Gel)  PROCEDURE ONCE,   Status:  Canceled      01/06/17 2229    01/06/17 2230  CBC Auto Differential  PROCEDURE ONCE      01/06/17 2229    01/06/17 2229  sodium chloride 0.9 % flush 10 mL  As Needed,   Status:  Discontinued      01/06/17 2229    01/06/17 2220  ECG 12 Lead  STAT      01/06/17 2219          Physician Progress Notes (last 72 hours) (Notes from 1/6/2017  9:26 AM through 1/9/2017  9:26 AM)     No notes of this type exist for this encounter.        Consult Notes (last 72 hours) (Notes from 1/6/2017  9:26 AM through 1/9/2017  9:26 AM)     No notes of this type exist for this encounter.           Discharge Summary      SANDOR Osuna at 1/8/2017 11:46 AM     Attestation signed by Ronaldo Gutierrez MD at 1/8/2017 10:08 PM        I have seen and evaluated this patient personally. I agree with the findings, assessment and plan as outlined by mid level provider. In addition, I have the following to  add.    Face to face encounter     LOS: 2 days   Patient Care Team:  No Known Provider as PCP - General    Chief Complaint:  Chest pain   Subjective   Feeling much better  No chest pain or excessive shortness of breath  No new complaints    Interval History: Improved overall    Patient Complaints:  Chief Complaint   Patient presents with   • [facility] transfer     Denies chest pain currently. Denies excessive shortness of breath. Denies abdominal pain, nausea vomiting or diarrhoea.    Telemetry: no malignant arrhythmia. No significant pauses.    History taken from: Patient and chart  Review of Systems:    The following systems were reviewed and negative; ENT, respiratory,  gastrointestinal, integument, hematologic / lymphatic, neurological, behavioral/psych and allergies / immunologic    Labs:    WBC WBC   Date Value Ref Range Status   01/06/2017 8.58 4.80 - 10.80 10*3/mm3 Final      HGB HEMOGLOBIN   Date Value Ref Range Status   01/06/2017 14.9 14.0 - 18.0 g/dL Final      HCT HEMATOCRIT   Date Value Ref Range Status   01/06/2017 42.4 40.0 - 52.0 % Final      Platlets PLATELETS   Date Value Ref Range Status   01/06/2017 369 130 - 400 10*3/mm3 Final      MCV MCV   Date Value Ref Range Status   01/06/2017 87.6 82.0 - 95.0 fL Final        Results from last 7 days  Lab Units 01/06/17  2239   SODIUM mmol/L 135   POTASSIUM mmol/L 4.4   CHLORIDE mmol/L 98   TOTAL CO2 mmol/L 25.0   BUN mg/dL 20   CREATININE mg/dL 0.86   CALCIUM mg/dL 9.2   BILIRUBIN mg/dL 0.5   ALK PHOS U/L 73   ALT (SGPT) U/L 94*   AST (SGOT) U/L 54*   GLUCOSE mg/dL 116*     Lab Results   Component Value Date    TROPONINI 112.000 (C) 12/26/2016     PT/INR:    PROTIME   Date Value Ref Range Status   01/06/2017 12.9 11.9 - 14.6 Seconds Final   /  INR   Date Value Ref Range Status   01/06/2017 0.94 0.91 - 1.09 Final       Imaging Results (last 72 hours)     Procedure Component Value Units Date/Time    XR Chest 1 View [[id number]] Collected:  01/07/17  "0907     Updated:  01/07/17 0958    Narrative:       EXAMINATION: XR CHEST 1 VW- 1/7/2017 9:00 CST     HISTORY: Acute STEMI triage protocol     COMPARISON: None     FINDINGS:  Heart is magnified but felt to be normal in size. Aorta mildly tortuous.  No focal consolidation or effusion. No pneumothorax. Normal pulmonary  vasculature.       Impression:       No acute findings.  This report was finalized on 01/07/2017 09:56 by Dr. Jose Rafael Hudson MD.          Objective     Medication Review:   No current facility-administered medications for this encounter.      Current Outpatient Prescriptions   Medication Sig Dispense Refill   • aspirin 81 MG chewable tablet Chew 1 tablet Daily.     • atorvastatin (LIPITOR) 80 MG tablet Take 1 tablet by mouth Every Night. 30 tablet 11   • colchicine 0.6 MG tablet Take 1 tablet by mouth 2 (Two) Times a Day. 60 tablet 0   • lisinopril (PRINIVIL,ZESTRIL) 5 MG tablet Take 1 tablet by mouth Daily. 30 tablet 11   • metoprolol tartrate (LOPRESSOR) 50 MG tablet Take 1 tablet by mouth Every 12 (Twelve) Hours. 60 tablet 11   • prasugrel (EFFIENT) 10 MG tablet Take 1 tablet by mouth Daily. 30 tablet 11       Vital Sign Min/Max for last 24 hours  Temp  Min: 97.3 °F (36.3 °C)  Max: 98 °F (36.7 °C)   BP  Min: 101/60  Max: 117/73   Pulse  Min: 76  Max: 89   Resp  Min: 18  Max: 20   SpO2  Min: 96 %  Max: 99 %   Flow (L/min)  Min: 2  Max: 3   No Data Recorded     Flowsheet Rows         First Filed Value    Admission Height  72\" (182.9 cm) Documented at 01/06/2017 2220    Admission Weight  250 lb (113 kg) Documented at 01/06/2017 2220          Physical Exam:  Ears ears appear intact with no abnormalities noted  Nose nares normal, septum midline, mucosa normal and no drainage  Neck suppple, trachea midline, no thyromegaly, no carotid bruit and no JVD  Back no kyphosis present, no scoliosis present, no skin lesions, erythema, or scars, no tenderness to percussion or palpation and range of motion " normal  Lungs respirations regular, respirations even and respirations unlabored  Heart normal S1, S2, no murmur, no lm, no rub and no click  Abdomen normal bowel sounds, no masses, no hepatomegaly, no splenomegaly, no guarding and no rebound tenderness  Skin no bleeding, bruising or rash     Results Review:   I reviewed the patient's new clinical results.  I reviewed the patient's new imaging results and agree with the interpretation.  I reviewed the patient's other test results and agree with the interpretation  I personally viewed and interpreted the patient's EKG/Telemetry data  Discussed with patient    Medication Review: Performed    Assessment/Plan     Principal Problem:    Post-MI pericarditis  Active Problems:    Mixed hyperlipidemia    Essential hypertension    Mobitz type 2 second degree heart block    Obesity    Coronary artery disease involving native coronary artery of native heart without angina pectoris    Plan  OK to discharge  Keep F/U with primary cardiologist Dr Rasmussen  Out patient Holter  Optimal medical therapy  Deep vein thrombosis precautions  Counseled regarding disease appropriate diet, fluid, sodium, caffeine, stimulants intake   Stressed compliance to diet and medications and regular follow up    Ronaldo Gutierrez MD  01/08/17  10:07 PM                                     [facility] HEART GROUP DISCHARGE    Date of Admission: 1/6/2017  Date of Discharge:  1/8/2017    Attending: Dr. Ronaldo Gutierrez     Discharge Diagnosis:   Principal Problem:    Post-MI pericarditis    Active Problems:    Mobitz type 2 second degree heart block, intermittent.     Coronary artery disease involving native coronary artery of native heart without angina pectoris    Mixed hyperlipidemia    Essential hypertension    Obesity      Presenting Problem/History of Present Illness  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]      Hospital Course  David Sandifer is a 46 y.o. male with a known PMH significant for CAD s/p STEMI  with DELIA to the proximal LAD (12/25/16). Patient presented to [facility] via ED with complaints of chest pain. Patient reports that he has been doing well since his MI 2 weeks ago and taking his medications as prescribed. The evening prior to presentation, he was coughing, when he had a sudden onset of midsternal chest pain associated with taking deep breaths and coughing, non exertional. Pain improved throughout the night. Patient admitted under the care of Dr. Gutierrez to  telemetry. He underwent Echo (see results below). On telemetry, patient was noted to have intermittent 2nd Degree type I while asleep- he was asymptomatic. He was started on Colchicine and was ready for discharge on 1.8.17 with plans for close PCP follow up.     Procedures Performed  · 1.7.17- Echo: Left ventricular function is low normal. Estimated EF = 60%.  · The following segments are hypokinetic: apical septal, apical lateral and apex.  · No pulmonary hypertension                     No pericardial effusion         Consults:   Consults     Date and Time Order Name Status Description    1/6/2017 2229 Consult Interventional Cardiology and Notify Cath Lab      12/25/2016 1606 IP Consult to Cardiology Completed           Pertinent Test Results:   Lab Results   Component Value Date    WBC 8.58 01/06/2017    HGB 14.9 01/06/2017    HCT 42.4 01/06/2017    MCV 87.6 01/06/2017     01/06/2017     Lab Results   Component Value Date    GLUCOSE 116 (H) 01/06/2017    CALCIUM 9.2 01/06/2017     01/06/2017    K 4.4 01/06/2017    CO2 25.0 01/06/2017    CL 98 01/06/2017    BUN 20 01/06/2017    CREATININE 0.86 01/06/2017    EGFRIFAFRI 116 01/06/2017    EGFRIFNONA 96 01/06/2017    BCR 23.3 01/06/2017    ANIONGAP 12.0 01/06/2017       Condition on Discharge:  Stable     Physical Exam at Discharge  Patient Vitals for the past 24 hrs:   BP Temp Temp src Pulse Resp SpO2 Weight   01/08/17 0748 117/73 97.3 °F (36.3 °C) Temporal Art 76 20 96 % -   01/08/17 0346  101/60 97.5 °F (36.4 °C) Temporal Art 77 18 97 % -   01/07/17 2332 103/61 97.3 °F (36.3 °C) Temporal Art 82 18 99 % -   01/07/17 2102 104/58 98.5 °F (36.9 °C) Temporal Art 78 18 97 % 244 lb 3 oz (111 kg)   01/07/17 1610 90/46 98.3 °F (36.8 °C) Temporal Art 72 20 97 % -   01/07/17 1151 97/60 98 °F (36.7 °C) Temporal Art 75 18 95 % -     Physical Exam    Discharge Disposition  Home or Self Care    Discharge Medications   Sandifer, David   [place] Medication Instructions YVONNE:[id number]    Printed on:01/08/17 1144   Medication Information                      aspirin 81 MG chewable tablet  Chew 1 tablet Daily.             atorvastatin (LIPITOR) 80 MG tablet  Take 1 tablet by mouth Every Night.             colchicine 0.6 MG tablet  Take 1 tablet by mouth 2 (Two) Times a Day.             lisinopril (PRINIVIL,ZESTRIL) 5 MG tablet  Take 1 tablet by mouth Daily.             metoprolol tartrate (LOPRESSOR) 50 MG tablet  Take 1 tablet by mouth Every 12 (Twelve) Hours.             prasugrel (EFFIENT) 10 MG tablet  Take 1 tablet by mouth Daily.                 Education:   1. Take all medications as prescribed.  Colchicine x1 month.   2. Monitor heart rate and blood pressure daily at home and bring readings to follow up  3. Call with any questions or concerns     Discharge Diet: Healthy Heart Diet     Activity at Discharge: Resume normal activity as tolerated.     Follow-up Appointments  Future Appointments  Date Time Provider Department Center   4/7/2017 9:00 AM Jose Rasmussen MD [facility] CD PAD None          SANDOR Osuna  01/08/17  11:46 AM    Time: Discharge 30 min         [facility] CARDIOLOGY  [address] [place] [id number] [phone]             David Sandifer   Acquired echocardiogram 2D complete with contrast   Order# [id number]   Reading physician: Ronaldo Gutierrez MD Ordering physician: SANDOR Osuna Study date: 1/7/17   Patient Information   Name MRN Description   David Sandifer  "[id number] 46 y.o. Male   Interpretation Summary   · Left ventricular function is low normal. Estimated EF = 60%.  · The following segments are hypokinetic: apical septal, apical lateral and apex.  · No pulmonary hypertension  · No pericardial effusion       Patient Height & Weight   Height Weight BSA (Calculated - sq m) BMI (kg/m2) Pulse   72\" (182.9 cm) 244 lb 3 oz (111 kg) 2.32 sq meters 33.24 89   Patient Vitals   BP Pulse   114/62 89   Reason For Exam   Symptoms or Conditions Related to Cardiac Etiology - R/O Pericarditis    Cardiac History   Diagnosis Date Comment   Coronary artery disease     Hypertension     Study Description   2D echo with color flow and Doppler performed. The study is technically good for diagnosis.Verbal consent was obtained from the patient to use Definity contrast in order to optimize the study.   Echocardiogram Findings   Left Ventricle  Left ventricular function is low normal. Calculated EF = 53.8%. Estimated EF = 60%. Normal left ventricular cavity size and wall thickness noted. Global left ventricular wall motion appears abnormal.   Right Ventricle  Normal cavity size, wall thickness, systolic function and septal motion noted.   Left Atrium  Normal left atrial size and volume noted.   Right Atrium  Normal right atrial size noted.   Aortic Valve  The aortic valve is structurally normal. Trace aortic valve regurgitation is present. No aortic valve stenosis is present.   Mitral Valve  The mitral valve is normal in structure. No mitral valve regurgitation is present. No significant mitral valve stenosis is present.   Tricuspid Valve  The tricuspid valve is normal. No tricuspid valve stenosis is present. Mild tricuspid valve regurgitation is present.   Pulmonic Valve  The pulmonic valve is structurally normal. There is no significant pulmonic valve stenosis present. There is no pulmonic valve regurgitation present.   Pericardium  The pericardium is normal. There is no evidence of " pericardial effusion.   Wall Scoring   Score Index: 2.000 Percent Normal: 0.0%             The following segments are hypokinetic: apical septal, apical lateral and apex.  Other segments could not be evaluated.               LV Measurements   Dimensions   LVIDd 5.2 cm      LVIDs 3.8 cm      IVSd 1.2 cm      LVPWd 1.2 cm      FS 26.8 %      IVS/LVPW 1.0       ESV(cubed) 53.6 ml      EDV(cubed) 136.6 ml      LVOT area 3.1 cm^2      Diastolic Filling   MV E/A 1.9       MV E max solis 102.0 cm/sec      MV A max solis 55.1 cm/sec      Lat Peak E' Solis 9.5 cm/sec      MV dec time 0.19 sec       Dimensions   LV mass(C)d 253.6 grams      LVOT diam 2.0 cm      EDV(MOD-sp4) 190.0 ml      ESV(MOD-sp4) 87.7 ml      EF(MOD-sp4) 53.8 %      SV(MOD-sp4) 102.3 ml      Shunt Ratio   SV(LVOT) 61.3 ml         LA Measurements   Measurements   LA dimension 3.7 cm      LA volume 96.2 cm3      LA/Ao 0.93          Aortic Valve Measurements   Stenosis   LVOT diam 2.0 cm      LV V1 max 106.0 cm/sec      LV V1 max PG 4.5 mmHg      LV V1 mean PG 2.0 mmHg      LV V1 VTI 19.5 cm      Ao pk solis 124.0 cm/sec       Stenosis   Ao max PG (full) 1.7 mmHg      Ao mean PG 3.0 mmHg      Ao V2 VTI 21.4 cm      ROMY(I,D) 2.9 cm^2         Mitral Valve Measurements   Stenosis   MV dec time 0.19 sec          Tricuspid Valve Measurements   TR max solis 251.0 cm/sec      RVSP(TR) 30.2 mmHg      RAP systole 5.0 mmHg      TR max solis 251.0 cm/sec         Greater Vessels Measurements   Ao root diam 4.0 cm         Study Information   Physician Technologist Supporting Staff    Augustin Woodson    PACS Images   Show images for Adult Transthoracic Echo Complete With Contrast   Signed   Electronically signed by Ronaldo Gutierrez MD on 1/8/17 at 0108 CST   Printable Result Report   Result Report    Encounter   View Encounter      Results Routing Tracking   View Results Routing Information   Adult Transthoracic Echo Complete With Contrast [ECH27] (Order [id number])   Echocardiography    Date: 1/7/2017 Department: [address] Released By/Authorizing: SANDOR Osuna (auto-released)   Procedure Abnormality Status   Adult Transthoracic Echo Complete With Contrast     Order History   Inpatient   Date/Time Action Taken User Additional Information    0000 Result Augustin Woodson In process   01/07/17 0824 Release SANDOR Osuna (auto-released) From Order: [id number]   01/07/17 1540 Result Interface, Rad Results [place] In In process   01/07/17 1541 Result Interface, Rad Results [place] In In process   01/07/17 1543 Result Augustin Woodosn In process   01/08/17 0103 Result Ronaldo Gutierrez MD Final   Order Details   Frequency Duration Priority Order Class   Once 1  occurrence Routine Hospital Performed   Start Date/Time   Start Date Start Time   01/07/17 0825   Comments   Rule out Pericarditis   Order Questions   Question Answer Comment   Where should test be performed? Department    Reason for exam? Symptoms or Conditions Related to Cardiac Etiology R/O Pericarditis    Procedures Performed   Code Procedure Name   ECH29 ACQUIRED ECHOCARDIOGRAM 2D COMPLETE WITH CONTRAST   Acknowledgement Info   For At Acknowledged By Acknowledged On   Placing Order 01/07/17 0824 Sravanthi Lombardi RN 01/07/17 0825   Reprint Order Requisition   Adult Transthoracic Echo Complete With Contrast (Order #[id number]) on 1/7/17   Encounter-Level Cardiology Documents:   There are no encounter-level cardiology documents.   Encounter   View Encounter   Appointments for this Order   1/7/2017 2:55 PM - 45 min PAD ECHO ROOM 1 (Resource) [facility] Cardiology   Order Provider Info       Office phone Pager E-mail   Ordering User SANDOR Osuna [phone] -- --   Authorizing Provider SANDOR Osuna [phone] -- --   Billing Provider Ronaldo Gutierrez [phone] -- --   Linked Charges   Charge Code Quantity Modifiers Diagnosis   HC TTE 2D W CONT COMP W SPEC COLR DOP   1      [facility] ECHO  HEART XTHORACIC,COMPLETE W DOPPLER  91032 1      SC ECHO HEART XTHORACIC,COMPLETE W DOPPLER  44642 1      Order Transmittal Tracking   Adult Transthoracic Echo Complete With Contrast (Order #[id number]) on 1/7/17   Authorized by: SANDOR Osuna (NPI: [id number])       Reviewed By List   SANDOR Osuna on 1/8/2017 11:16 AM                Electronically signed by Ronaldo Gutierrez MD at 1/8/2017 10:08 PM

## 2017-01-17 ENCOUNTER — TELEPHONE (OUTPATIENT)
Dept: CARDIOLOGY | Facility: CLINIC | Age: 47
End: 2017-01-17

## 2017-01-17 NOTE — TELEPHONE ENCOUNTER
Pt wants to go back to work.  Can you release him?  He was stented 12/25/16.  Gibran Beltran, CMA

## 2017-01-19 NOTE — TELEPHONE ENCOUNTER
Pt informed and letter faxed to Marlen Henao at [phone] and mailed to pt.  Gibran Beltran, CMA

## 2017-04-07 ENCOUNTER — OFFICE VISIT (OUTPATIENT)
Dept: CARDIOLOGY | Facility: CLINIC | Age: 47
End: 2017-04-07

## 2017-04-07 VITALS
SYSTOLIC BLOOD PRESSURE: 110 MMHG | WEIGHT: 250 LBS | BODY MASS INDEX: 33.86 KG/M2 | HEART RATE: 75 BPM | DIASTOLIC BLOOD PRESSURE: 72 MMHG | HEIGHT: 72 IN

## 2017-04-07 DIAGNOSIS — I10 ESSENTIAL HYPERTENSION: ICD-10-CM

## 2017-04-07 DIAGNOSIS — E78.2 MIXED HYPERLIPIDEMIA: ICD-10-CM

## 2017-04-07 DIAGNOSIS — I25.10 CORONARY ARTERY DISEASE INVOLVING NATIVE CORONARY ARTERY OF NATIVE HEART WITHOUT ANGINA PECTORIS: Primary | ICD-10-CM

## 2017-04-07 PROBLEM — I24.1: Status: RESOLVED | Noted: 2017-01-06 | Resolved: 2017-04-07

## 2017-04-07 PROBLEM — I44.1 MOBITZ TYPE 2 SECOND DEGREE HEART BLOCK: Status: RESOLVED | Noted: 2017-01-07 | Resolved: 2017-04-07

## 2017-04-07 PROCEDURE — 99214 OFFICE O/P EST MOD 30 MIN: CPT | Performed by: INTERNAL MEDICINE

## 2017-04-07 PROCEDURE — 93000 ELECTROCARDIOGRAM COMPLETE: CPT | Performed by: INTERNAL MEDICINE

## 2017-04-07 RX ORDER — IBUPROFEN 200 MG
200 TABLET ORAL EVERY 6 HOURS PRN
COMMUNITY
End: 2022-04-15

## 2017-04-07 NOTE — PROGRESS NOTES
Referring Provider: No Known Provider    Reason for Follow-up Visit: CAD    Subjective .   Chief Complaint:   Chief Complaint   Patient presents with   • Follow-up     3 mo hospital fu. s/p cath with stent.  feels much better   • Shortness of Breath     still getting sob at times.   • Congestive Heart Failure     no chest pain.       History of present illness:  David Sandifer is a 47 y.o. yo male with history of CAD, s/p DELIA to the LAD due to STEMI. Now doing very well. Denies CP or SOB.        History  Past Medical History:   Diagnosis Date   • Coronary artery disease    • Hyperlipidemia    • Hypertension    • Myocardial infarction 12/25/2016    STEMI s/p DELIA LAD    • Sleep apnea     not diagnosed   ,   Past Surgical History:   Procedure Laterality Date   • CARDIAC CATHETERIZATION N/A 12/25/2016    Procedure: Left Heart Cath;  Surgeon: Jose Rasmussen MD;  Location: [facility] CATH INVASIVE LOCATION;  Service:    • CORONARY STENT PLACEMENT  12/25/2016    LAD    • THUMB CARPOMETACARPAL JOINT ARTHROPLASTY FLEXOR CARPI RADIALIS TENDON     ,   Family History   Problem Relation Age of Onset   • Diabetes Mother    • Cancer Father    • No Known Problems Sister    • No Known Problems Brother    • Cancer Maternal Grandmother    • Heart attack Maternal Grandfather    • Cancer Paternal Grandmother    • No Known Problems Paternal Grandfather    • No Known Problems Sister    • No Known Problems Brother    ,   Social History   Substance Use Topics   • Smoking status: Former Smoker     Types: Cigarettes, Electronic Cigarette     Start date: 1987     Quit date: 12/25/2016   • Smokeless tobacco: Never Used      Comment: will use the e-cig every now and then.   • Alcohol use 1.2 oz/week     2 Cans of beer per week      Comment: occasionally   ,     Medications  Current Outpatient Prescriptions   Medication Sig Dispense Refill   • aspirin 81 MG chewable tablet Chew 1 tablet Daily.     • atorvastatin (LIPITOR) 80 MG tablet Take 1 tablet  "by mouth Every Night. 30 tablet 11   • ibuprofen (ADVIL,MOTRIN) 200 MG tablet Take 200 mg by mouth Every 6 (Six) Hours As Needed for Mild Pain (1-3).     • lisinopril (PRINIVIL,ZESTRIL) 5 MG tablet Take 1 tablet by mouth Daily. 30 tablet 11   • metoprolol tartrate (LOPRESSOR) 50 MG tablet Take 1 tablet by mouth Every 12 (Twelve) Hours. 60 tablet 11   • prasugrel (EFFIENT) 10 MG tablet Take 1 tablet by mouth Daily. 30 tablet 11   • colchicine 0.6 MG tablet Take 1 tablet by mouth 2 (Two) Times a Day. 60 tablet 0     No current facility-administered medications for this visit.        Allergies:  Review of patient's allergies indicates no known allergies.    Review of Systems  Review of Systems   HENT: Negative for nosebleeds.    Cardiovascular: Negative for chest pain, claudication, dyspnea on exertion, irregular heartbeat, leg swelling, near-syncope, orthopnea, palpitations, paroxysmal nocturnal dyspnea and syncope.   Respiratory: Negative for cough, hemoptysis and shortness of breath.    Gastrointestinal: Negative for dysphagia, hematemesis and melena.   Genitourinary: Negative for hematuria.       Objective     Physical Exam:  /72 (BP Location: Left arm, Patient Position: Sitting, Cuff Size: Adult)  Pulse 75  Ht 72\" (182.9 cm)  Wt 250 lb (113 kg)  BMI 33.91 kg/m2  Physical Exam   Constitutional: He is oriented to person, place, and time. He appears well-nourished. No distress.   HENT:   Head: Normocephalic.   Eyes: No scleral icterus.   Neck: Normal range of motion. Neck supple.   Cardiovascular: Normal rate, regular rhythm and normal heart sounds.  Exam reveals no gallop and no friction rub.    No murmur heard.  Pulmonary/Chest: Effort normal and breath sounds normal. No respiratory distress. He has no wheezes. He has no rales.   Abdominal: Soft. Bowel sounds are normal. He exhibits no distension. There is no tenderness.   Musculoskeletal: He exhibits no edema.   Neurological: He is alert and oriented to " person, place, and time.   Skin: Skin is warm and dry. He is not diaphoretic. No erythema.   Psychiatric: He has a normal mood and affect. His behavior is normal.       Results Review:    ECG 12 Lead  Date/Time: 4/7/2017 9:44 AM  Performed by: HUNG LAM  Authorized by: HUNG LAM   Comparison: compared with previous ECG   Comparison to previous ECG: Resolution of ST elevation  Rhythm: sinus rhythm  Rate: normal  Conduction: conduction normal  ST Segments: ST segments normal  T Waves: T waves normal  QRS axis: normal  Clinical impression: non-specific ECG  Comments: Previous anterior MI            Admission on 01/06/2017, Discharged on 01/08/2017   Component Date Value Ref Range Status   • Glucose 01/06/2017 116* 70 - 100 mg/dL Final   • BUN 01/06/2017 20  5 - 21 mg/dL Final   • Creatinine 01/06/2017 0.86  0.50 - 1.40 mg/dL Final   • Sodium 01/06/2017 135  135 - 145 mmol/L Final   • Potassium 01/06/2017 4.4  3.5 - 5.3 mmol/L Final   • Chloride 01/06/2017 98  98 - 110 mmol/L Final   • CO2 01/06/2017 25.0  24.0 - 31.0 mmol/L Final   • Calcium 01/06/2017 9.2  8.4 - 10.4 mg/dL Final   • Total Protein 01/06/2017 7.5  6.3 - 8.7 g/dL Final   • Albumin 01/06/2017 4.20  3.50 - 5.00 g/dL Final   • ALT (SGPT) 01/06/2017 94* 0 - 54 U/L Final   • AST (SGOT) 01/06/2017 54* 7 - 45 U/L Final   • Alkaline Phosphatase 01/06/2017 73  24 - 120 U/L Final   • Total Bilirubin 01/06/2017 0.5  0.1 - 1.0 mg/dL Final   • eGFR Non African Amer 01/06/2017 96  >60 mL/min/1.73 Final   • eGFR   Amer 01/06/2017 116  >60 mL/min/1.73 Final   • Globulin 01/06/2017 3.3  gm/dL Final   • A/G Ratio 01/06/2017 1.3  1.1 - 2.5 g/dL Final   • BUN/Creatinine Ratio 01/06/2017 23.3  7.0 - 25.0 Final   • Anion Gap 01/06/2017 12.0  4.0 - 13.0 mmol/L Final   • Protime 01/06/2017 12.9  11.9 - 14.6 Seconds Final   • INR 01/06/2017 0.94  0.91 - 1.09 Final   • Extra Tube 01/06/2017 hold for add-on   Final    Auto resulted   • Extra Tube 01/06/2017 Hold for  add-ons.   Final    Auto resulted.   • Extra Tube 01/06/2017 hold for add-on   Final    Auto resulted   • Extra Tube 01/06/2017 Hold for add-ons.   Final    Auto resulted.   • WBC 01/06/2017 8.58  4.80 - 10.80 10*3/mm3 Final   • RBC 01/06/2017 4.84  4.80 - 5.90 10*6/mm3 Final   • Hemoglobin 01/06/2017 14.9  14.0 - 18.0 g/dL Final   • Hematocrit 01/06/2017 42.4  40.0 - 52.0 % Final   • MCV 01/06/2017 87.6  82.0 - 95.0 fL Final   • MCH 01/06/2017 30.8  28.0 - 32.0 pg Final   • MCHC 01/06/2017 35.1  33.0 - 36.0 g/dL Final   • RDW 01/06/2017 12.7  12.0 - 15.0 % Final   • RDW-SD 01/06/2017 41.0  40.0 - 54.0 fl Final   • MPV 01/06/2017 10.1  6.0 - 12.0 fL Final   • Platelets 01/06/2017 369  130 - 400 10*3/mm3 Final   • Neutrophil % 01/06/2017 55.4  39.0 - 78.0 % Final   • Lymphocyte % 01/06/2017 31.7  15.0 - 45.0 % Final   • Monocyte % 01/06/2017 7.8  4.0 - 12.0 % Final   • Eosinophil % 01/06/2017 4.1* 0.0 - 4.0 % Final   • Basophil % 01/06/2017 0.5  0.0 - 2.0 % Final   • Immature Grans % 01/06/2017 0.5  0.0 - 5.0 % Final   • Neutrophils, Absolute 01/06/2017 4.76  1.87 - 8.40 10*3/mm3 Final   • Lymphocytes, Absolute 01/06/2017 2.72  0.72 - 4.86 10*3/mm3 Final   • Monocytes, Absolute 01/06/2017 0.67  0.19 - 1.30 10*3/mm3 Final   • Eosinophils, Absolute 01/06/2017 0.35  0.00 - 0.70 10*3/mm3 Final   • Basophils, Absolute 01/06/2017 0.04  0.00 - 0.20 10*3/mm3 Final   • Immature Grans, Absolute 01/06/2017 0.04* 0.00 - 0.03 10*3/mm3 Final   • Troponin I 01/06/2017 0.26* 0.00 - 0.07 ng/mL Final    Serial Number: [id number]    : [id number]   • IVSd 01/07/2017 1.2  cm Preliminary   • LVIDd 01/07/2017 5.2  cm Preliminary   • LVIDs 01/07/2017 3.8  cm Preliminary   • LVPWd 01/07/2017 1.2  cm Preliminary   • IVS/LVPW 01/07/2017 1.0   Preliminary   • FS 01/07/2017 26.8  % Preliminary   • EDV(Teich) 01/07/2017 126.6  ml Preliminary   • ESV(Teich) 01/07/2017 60.8  ml Preliminary   • EF(Teich) 01/07/2017 52.0  % Preliminary   •  EDV(cubed) 01/07/2017 136.6  ml Preliminary   • ESV(cubed) 01/07/2017 53.6  ml Preliminary   • EF(cubed) 01/07/2017 60.8  % Preliminary   • LV mass(C)d 01/07/2017 253.6  grams Preliminary   • SV(Teich) 01/07/2017 65.8  ml Preliminary   • SV(cubed) 01/07/2017 83.0  ml Preliminary   • Ao root diam 01/07/2017 4.0  cm Preliminary   • Ao root area 01/07/2017 12.6  cm^2 Preliminary   • LA dimension 01/07/2017 3.7  cm Preliminary   • LA/Ao 01/07/2017 0.93   Preliminary   • LVOT diam 01/07/2017 2.0  cm Preliminary   • LVOT area 01/07/2017 3.1  cm^2 Preliminary   • LVOT area(traced) 01/07/2017 3.1  cm^2 Preliminary   • LVLd ap4 01/07/2017 10.8  cm Preliminary   • EDV(MOD-sp4) 01/07/2017 190.0  ml Preliminary   • LVLs ap4 01/07/2017 8.9  cm Preliminary   • ESV(MOD-sp4) 01/07/2017 87.7  ml Preliminary   • EF(MOD-sp4) 01/07/2017 53.8  % Preliminary   • SV(MOD-sp4) 01/07/2017 102.3  ml Preliminary   • CONTRAST EF 4CH 01/07/2017 53.8  ml/m^2 Preliminary   • MV E max pawan 01/07/2017 102.0  cm/sec Preliminary   • MV A max pawan 01/07/2017 55.1  cm/sec Preliminary   • MV E/A 01/07/2017 1.9   Preliminary   • MV dec time 01/07/2017 0.19  sec Preliminary   • Ao pk pawan 01/07/2017 124.0  cm/sec Preliminary   • Ao max PG 01/07/2017 6.2  mmHg Preliminary   • Ao max PG (full) 01/07/2017 1.7  mmHg Preliminary   • Ao V2 mean 01/07/2017 85.0  cm/sec Preliminary   • Ao mean PG 01/07/2017 3.0  mmHg Preliminary   • Ao mean PG (full) 01/07/2017 1.0  mmHg Preliminary   • Ao V2 VTI 01/07/2017 21.4  cm Preliminary   • ROMY(I,A) 01/07/2017 2.9  cm^2 Preliminary   • ROMY(I,D) 01/07/2017 2.9  cm^2 Preliminary   • ROMY(V,A) 01/07/2017 2.7  cm^2 Preliminary   • ROMY(V,D) 01/07/2017 2.7  cm^2 Preliminary   • LV V1 max PG 01/07/2017 4.5  mmHg Preliminary   • LV V1 mean PG 01/07/2017 2.0  mmHg Preliminary   • LV V1 max 01/07/2017 106.0  cm/sec Preliminary   • LV V1 mean 01/07/2017 67.2  cm/sec Preliminary   • LV V1 VTI 01/07/2017 19.5  cm Preliminary   • SV(Ao)  01/07/2017 268.9  ml Preliminary   • SV(LVOT) 01/07/2017 61.3  ml Preliminary   • TR max solis 01/07/2017 251.0  cm/sec Preliminary   • RVSP(TR) 01/07/2017 30.2  mmHg Preliminary   • RAP systole 01/07/2017 5.0  mmHg Preliminary   • LA volume 01/07/2017 96.2  cm3 Final   • Lat Peak E' Solis 01/07/2017 9.5  cm/sec Final   • Echo EF Estimated 01/07/2017 60  % Final       Assessment/Plan   Doug was seen today for follow-up, shortness of breath and congestive heart failure.    Diagnoses and all orders for this visit:    Coronary artery disease involving native coronary artery of native heart without angina pectoris, doing well    Essential hypertension, good control    Mixed hyperlipidemia, needs to be checked

## 2017-11-14 NOTE — TELEPHONE ENCOUNTER
Pts wife called asking for refills on Effient, Lisinopril, Metoprolol and Lipitor to be sent to [facility] for a 90 day supply.  He has a yearly coming up in April.  She also asked for another coupon for the Effient.  I told her since its gone generic they do not offer any discount cards anymore.  I suggested that we see if Dr. Rasmussen would switch it to the Brilinta so that he can use the $18 for a 3 month supply coupon since he has commercial insurance.  This will need to be done at the first of the year.  Please advise.  Gibran Beltran, CMA

## 2017-11-17 RX ORDER — LISINOPRIL 5 MG/1
5 TABLET ORAL
Qty: 90 TABLET | Refills: 3 | Status: SHIPPED | OUTPATIENT
Start: 2017-11-17 | End: 2018-05-29 | Stop reason: SDUPTHER

## 2017-11-17 RX ORDER — PRASUGREL 10 MG/1
10 TABLET, FILM COATED ORAL DAILY
Qty: 90 TABLET | Refills: 3 | Status: SHIPPED | OUTPATIENT
Start: 2017-11-17 | End: 2019-03-06 | Stop reason: SDUPTHER

## 2017-11-17 RX ORDER — ATORVASTATIN CALCIUM 80 MG/1
80 TABLET, FILM COATED ORAL NIGHTLY
Qty: 90 TABLET | Refills: 3 | Status: SHIPPED | OUTPATIENT
Start: 2017-11-17 | End: 2018-05-29 | Stop reason: SDUPTHER

## 2017-11-17 RX ORDER — METOPROLOL TARTRATE 50 MG/1
50 TABLET, FILM COATED ORAL EVERY 12 HOURS SCHEDULED
Qty: 180 TABLET | Refills: 3 | Status: SHIPPED | OUTPATIENT
Start: 2017-11-17 | End: 2018-05-29 | Stop reason: SDUPTHER

## 2018-03-27 DIAGNOSIS — I25.10 CORONARY ARTERY DISEASE INVOLVING NATIVE CORONARY ARTERY OF NATIVE HEART WITHOUT ANGINA PECTORIS: Primary | ICD-10-CM

## 2018-04-03 NOTE — TELEPHONE ENCOUNTER
Pt informed.  I left a  msg.  I will send in the rx and the order for lab.  I explained in the msg about having the lab done and told the pt to call me back if any questions.  Gibran Beltran, CMA

## 2018-04-04 RX ORDER — CLOPIDOGREL BISULFATE 75 MG/1
75 TABLET ORAL DAILY
Qty: 30 TABLET | Refills: 11 | Status: SHIPPED | OUTPATIENT
Start: 2018-04-04 | End: 2019-03-06

## 2018-04-23 ENCOUNTER — OFFICE VISIT (OUTPATIENT)
Dept: CARDIOLOGY | Facility: CLINIC | Age: 48
End: 2018-04-23

## 2018-04-23 ENCOUNTER — TELEPHONE (OUTPATIENT)
Dept: CARDIOLOGY | Facility: CLINIC | Age: 48
End: 2018-04-23

## 2018-04-23 ENCOUNTER — LAB (OUTPATIENT)
Dept: LAB | Facility: HOSPITAL | Age: 48
End: 2018-04-23

## 2018-04-23 VITALS
HEART RATE: 65 BPM | HEIGHT: 72 IN | BODY MASS INDEX: 36.7 KG/M2 | SYSTOLIC BLOOD PRESSURE: 116 MMHG | DIASTOLIC BLOOD PRESSURE: 82 MMHG | WEIGHT: 271 LBS

## 2018-04-23 DIAGNOSIS — E78.2 MIXED HYPERLIPIDEMIA: Primary | ICD-10-CM

## 2018-04-23 DIAGNOSIS — IMO0001 CLASS 2 OBESITY WITH SERIOUS COMORBIDITY AND BODY MASS INDEX (BMI) OF 36.0 TO 36.9 IN ADULT, UNSPECIFIED OBESITY TYPE: ICD-10-CM

## 2018-04-23 DIAGNOSIS — I25.10 CORONARY ARTERY DISEASE INVOLVING NATIVE CORONARY ARTERY OF NATIVE HEART WITHOUT ANGINA PECTORIS: Primary | ICD-10-CM

## 2018-04-23 DIAGNOSIS — I25.10 CORONARY ARTERY DISEASE INVOLVING NATIVE CORONARY ARTERY OF NATIVE HEART WITHOUT ANGINA PECTORIS: ICD-10-CM

## 2018-04-23 DIAGNOSIS — E78.2 MIXED HYPERLIPIDEMIA: ICD-10-CM

## 2018-04-23 DIAGNOSIS — R06.02 SHORTNESS OF BREATH: ICD-10-CM

## 2018-04-23 DIAGNOSIS — I10 ESSENTIAL HYPERTENSION: ICD-10-CM

## 2018-04-23 LAB
ARTICHOKE IGE QN: 88 MG/DL (ref 0–99)
CHOLEST SERPL-MCNC: 161 MG/DL (ref 130–200)
HCT VFR BLD AUTO: 44.6 % (ref 40–52)
HDLC SERPL-MCNC: 34 MG/DL
LDLC/HDLC SERPL: 2.29 {RATIO}
PA ADP PRP-ACNC: 208 PRU
PLATELET # BLD AUTO: 333 10*3/MM3 (ref 130–400)
TRIGL SERPL-MCNC: 245 MG/DL (ref 0–149)

## 2018-04-23 PROCEDURE — 85576 BLOOD PLATELET AGGREGATION: CPT | Performed by: INTERNAL MEDICINE

## 2018-04-23 PROCEDURE — 80061 LIPID PANEL: CPT | Performed by: NURSE PRACTITIONER

## 2018-04-23 PROCEDURE — 36415 COLL VENOUS BLD VENIPUNCTURE: CPT | Performed by: NURSE PRACTITIONER

## 2018-04-23 PROCEDURE — 99214 OFFICE O/P EST MOD 30 MIN: CPT | Performed by: NURSE PRACTITIONER

## 2018-04-23 PROCEDURE — 93000 ELECTROCARDIOGRAM COMPLETE: CPT | Performed by: NURSE PRACTITIONER

## 2018-04-23 RX ORDER — ERGOCALCIFEROL (VITAMIN D2) 10 MCG
400 TABLET ORAL DAILY
COMMUNITY
End: 2021-01-22 | Stop reason: SDDI

## 2018-04-23 RX ORDER — FERROUS SULFATE 325(65) MG
325 TABLET ORAL
COMMUNITY
End: 2019-08-22

## 2018-04-23 NOTE — TELEPHONE ENCOUNTER
----- Message from SANDOR Busby sent at 4/23/2018 11:15 AM CDT -----  Please let him know his LDL is well controlled. His triglycerides are high but have improved significantly. Please send a copy of this to Dr. Brown's office. Thanks

## 2018-04-23 NOTE — PROGRESS NOTES
Subjective:     Encounter Date:04/23/2018      Patient ID: David Sandifer is a 48 y.o. male.    Chief Complaint:  Coronary Artery Disease   Presents for follow-up visit. Symptoms include shortness of breath. Pertinent negatives include no chest pain, chest pressure, chest tightness, dizziness, leg swelling, muscle weakness, palpitations or weight gain. Risk factors include hyperlipidemia. The symptoms have been stable. Compliance with diet is good. Compliance with exercise is good. Compliance with medications is good.   Shortness of Breath   This is a chronic problem. The current episode started more than 1 year ago. The problem occurs intermittently. The problem has been unchanged. Pertinent negatives include no abdominal pain, chest pain, fever, headaches, hemoptysis, leg swelling, orthopnea, PND, rash, syncope or vomiting. The symptoms are aggravated by exercise (bending over). His past medical history is significant for CAD.   Hyperlipidemia   This is a chronic problem. The current episode started more than 1 year ago. The problem is controlled. Associated symptoms include shortness of breath. Pertinent negatives include no chest pain. Current antihyperlipidemic treatment includes statins.     Patient is here for yearly follow up for CAD with STEMI to LAD December 2016 with ischemic CMO with stable EF. Patient states overall he has been doing well. He does have some chronic shortness of breath but this is essentially unchanged. He sees Dr. Brown who had ordered sleep study for patient but he could not afford at that time. Patient is active without limitation. Denies syncope or palpitations. Shortness of breath occurs randomly- worse with bending over. Typicall does not occur on exertion. Has not had Lipid Panel checked since time of STEMI. Patient was having trouble affording Effient and was told to switch to Plavix but patient states he never got the medication. Patient has been without Effient for 1  month without any issue. Costs is a huge concern for patient. No missed doses of any other medications.     The following portions of the patient's history were reviewed and updated as appropriate: allergies, current medications, past family history, past medical history, past social history, past surgical history and problem list.    Review of Systems   Constitution: Negative for chills, decreased appetite, fever, malaise/fatigue, weight gain and weight loss.   HENT: Negative for nosebleeds.    Eyes: Negative for visual disturbance.   Cardiovascular: Negative for chest pain, dyspnea on exertion, leg swelling, near-syncope, orthopnea, palpitations, paroxysmal nocturnal dyspnea and syncope.   Respiratory: Positive for shortness of breath. Negative for chest tightness, cough, hemoptysis and snoring.    Endocrine: Negative for cold intolerance and heat intolerance.   Hematologic/Lymphatic: Negative for bleeding problem. Does not bruise/bleed easily.   Skin: Negative for rash.   Musculoskeletal: Negative for back pain, falls and muscle weakness.   Gastrointestinal: Negative for abdominal pain, constipation, diarrhea, heartburn, melena, nausea and vomiting.   Genitourinary: Negative for hematuria.   Neurological: Negative for dizziness, headaches and light-headedness.   Psychiatric/Behavioral: Negative for altered mental status.   Allergic/Immunologic: Negative for persistent infections.         ECG 12 Lead  Date/Time: 4/23/2018 11:39 AM  Performed by: JAMISON JOSUE  Authorized by: JAMISON JOSUE   Comparison: compared with previous ECG from 4/7/2017  Similar to previous ECG  Comparison to previous ECG: Lengthening MS - asymptomatic  Rhythm: sinus rhythm and A-V block               Objective:     Physical Exam   Constitutional: He is oriented to person, place, and time. He appears well-developed and well-nourished.   HENT:   Head: Normocephalic and atraumatic.   Eyes: Pupils are equal, round, and reactive to light.  "  Neck: Normal range of motion. Neck supple. No JVD present. Carotid bruit is not present.   Cardiovascular: Normal rate, regular rhythm, normal heart sounds and intact distal pulses.    Pulmonary/Chest: Effort normal and breath sounds normal.   Abdominal: Soft. Bowel sounds are normal.   Musculoskeletal: Normal range of motion.   Neurological: He is alert and oriented to person, place, and time. He has normal reflexes.   Skin: Skin is warm and dry.   Psychiatric: He has a normal mood and affect. His behavior is normal. Judgment and thought content normal.     Blood pressure 116/82, pulse 65, height 182.9 cm (72\"), weight 123 kg (271 lb).      Lab Review:       Assessment:          Diagnosis Plan   1. Coronary artery disease involving native coronary artery of native heart without angina pectoris     2. Essential hypertension     3. Mixed hyperlipidemia     4. Class 2 obesity with serious comorbidity and body mass index (BMI) of 36.0 to 36.9 in adult, unspecified obesity type     5. Shortness of breath            Plan:       1. Coronary Artery Disease with STEMI to LAD. No clinical evidence of ischemia. On statin, aspirin, BB and ACE. Patient has been without effient for over a month. Will discuss with Dr. Rasmussen resuming Effient or Plavix or aspirin only.  2. Blood Pressure controlled  3. Check Lipid panel. On Lipitor 80. High triglycerides in past  4. Patient's Body mass index is 36.75 kg/m². BMI is above normal parameters. Follow-up plan includes:  exercise counseling and nutrition counseling.  5. Shortness of breath stable unchanged. Likely due to obesity and deconditioning. Encouraged exercise.          "

## 2018-04-24 ENCOUNTER — TELEPHONE (OUTPATIENT)
Dept: CARDIOLOGY | Facility: CLINIC | Age: 48
End: 2018-04-24

## 2018-05-04 ENCOUNTER — TELEPHONE (OUTPATIENT)
Dept: CARDIOLOGY | Facility: CLINIC | Age: 48
End: 2018-05-04

## 2018-05-04 NOTE — TELEPHONE ENCOUNTER
----- Message from Jose Rasmussen MD sent at 4/24/2018 11:49 AM CDT -----  Stay on what ever he is taking now      Pt informed.  He is just on asa per Vernon Messer NP from last OV.  Gibran Beltran, CMA

## 2018-05-04 NOTE — TELEPHONE ENCOUNTER
We also discussed his lipid panel.  Pt is on lipitor 80 mg but has not been watching his diet or exercising.  I explained the importance of having a LDL of below 70 with CAD.  I told pt to go online to get a low cholesterol diet and try walking at least 3 days a week to see if this will come down the next time he has labs.  Pt stated understanding.  Gibran Beltran, CMA

## 2018-05-29 RX ORDER — METOPROLOL TARTRATE 50 MG/1
50 TABLET, FILM COATED ORAL EVERY 12 HOURS
Qty: 480 TABLET | Refills: 0 | Status: SHIPPED | OUTPATIENT
Start: 2018-05-29 | End: 2019-03-06 | Stop reason: SDUPTHER

## 2018-05-29 RX ORDER — LISINOPRIL 5 MG/1
TABLET ORAL
Qty: 240 TABLET | Refills: 0 | Status: SHIPPED | OUTPATIENT
Start: 2018-05-29 | End: 2019-03-06 | Stop reason: SDUPTHER

## 2018-05-29 RX ORDER — ATORVASTATIN CALCIUM 80 MG/1
TABLET, FILM COATED ORAL
Qty: 240 TABLET | Refills: 0 | Status: SHIPPED | OUTPATIENT
Start: 2018-05-29 | End: 2019-03-06 | Stop reason: SDUPTHER

## 2018-05-29 NOTE — TELEPHONE ENCOUNTER
Pts wife called today asking about these rx.  They are traveling so [facility].  Gibran Beltran, CMA

## 2019-03-07 RX ORDER — LISINOPRIL 5 MG/1
5 TABLET ORAL DAILY
Qty: 90 TABLET | Refills: 3 | Status: SHIPPED | OUTPATIENT
Start: 2019-03-07 | End: 2020-02-28 | Stop reason: SDUPTHER

## 2019-03-07 RX ORDER — ATORVASTATIN CALCIUM 80 MG/1
80 TABLET, FILM COATED ORAL EVERY EVENING
Qty: 90 TABLET | Refills: 3 | Status: SHIPPED | OUTPATIENT
Start: 2019-03-07 | End: 2020-02-28 | Stop reason: SDUPTHER

## 2019-03-07 RX ORDER — METOPROLOL TARTRATE 50 MG/1
50 TABLET, FILM COATED ORAL EVERY 12 HOURS
Qty: 90 TABLET | Refills: 3 | Status: SHIPPED | OUTPATIENT
Start: 2019-03-07 | End: 2020-02-28 | Stop reason: SDUPTHER

## 2019-05-28 ENCOUNTER — TELEPHONE (OUTPATIENT)
Dept: CARDIOLOGY | Facility: CLINIC | Age: 49
End: 2019-05-28

## 2019-05-28 NOTE — TELEPHONE ENCOUNTER
----- Message from Jose Rasmussen MD sent at 5/20/2019 12:38 PM CDT -----  LDL is 83, could be lower    5/28/19      Pt informed.  Mailed result letter.  Gibran Beltran, CMA

## 2019-08-09 ENCOUNTER — OFFICE VISIT (OUTPATIENT)
Dept: CARDIOLOGY | Facility: CLINIC | Age: 49
End: 2019-08-09

## 2019-08-09 VITALS
HEIGHT: 72 IN | SYSTOLIC BLOOD PRESSURE: 109 MMHG | OXYGEN SATURATION: 98 % | BODY MASS INDEX: 35.62 KG/M2 | WEIGHT: 263 LBS | DIASTOLIC BLOOD PRESSURE: 76 MMHG | HEART RATE: 62 BPM

## 2019-08-09 DIAGNOSIS — E78.2 MIXED HYPERLIPIDEMIA: ICD-10-CM

## 2019-08-09 DIAGNOSIS — R94.31 ABNORMAL ECG: Primary | ICD-10-CM

## 2019-08-09 DIAGNOSIS — I25.10 CORONARY ARTERY DISEASE INVOLVING NATIVE CORONARY ARTERY OF NATIVE HEART WITHOUT ANGINA PECTORIS: ICD-10-CM

## 2019-08-09 DIAGNOSIS — I10 ESSENTIAL HYPERTENSION: ICD-10-CM

## 2019-08-09 PROCEDURE — 99214 OFFICE O/P EST MOD 30 MIN: CPT | Performed by: INTERNAL MEDICINE

## 2019-08-09 PROCEDURE — 93000 ELECTROCARDIOGRAM COMPLETE: CPT | Performed by: INTERNAL MEDICINE

## 2019-08-09 NOTE — PROGRESS NOTES
Referring Provider: Siddhartha Patterson MD    Reason for Follow-up Visit: CAD    Subjective .   Chief Complaint:   Chief Complaint   Patient presents with   • Follow-up     yearly   • Coronary Artery Disease     s/p STEMI  pt states he is doing good.  he still has some sob at times when he is trying to get ready to go somewhere or with exertion.   • Hypertension     pt states he does not get checked except when goes to a doctor and its usually good.   • Hyperlipidemia     pt has labs done at pcp.  last lab was 19 LDL 83 on Lipitor 80 mg.       History of present illness:  David Sandifer is a 49 y.o. yo male with history of CAD, s/p anterior STEMI treated with DELIA. He denies any CP or SOB.        History  Past Medical History:   Diagnosis Date   • Coronary artery disease    • Diabetes mellitus (CMS/HCC)     type 2   • Hyperlipidemia    • Hypertension    • Myocardial infarction (CMS/HCC) 2016    STEMI s/p DELIA LAD    • Sleep apnea     not diagnosed   ,   Past Surgical History:   Procedure Laterality Date   • CARDIAC CATHETERIZATION N/A 2016    Procedure: Left Heart Cath;  Surgeon: Jose Rasmussen MD;  Location:  [facility];  Service:    • CORONARY STENT PLACEMENT  2016    LAD    • THUMB CARPOMETACARPAL JOINT ARTHROPLASTY FLEXOR CARPI RADIALIS TENDON     ,   Family History   Problem Relation Age of Onset   • Diabetes Mother    • Cancer Father    • No Known Problems Sister    • No Known Problems Brother    • Cancer Maternal Grandmother    • Heart attack Maternal Grandfather    • Cancer Paternal Grandmother    • No Known Problems Paternal Grandfather    • No Known Problems Sister    • No Known Problems Brother    ,   Social History     Tobacco Use   • Smoking status: Former Smoker     Types: Cigarettes, Electronic Cigarette     Start date:      Last attempt to quit: 2016     Years since quittin.6   • Smokeless tobacco: Never Used   Substance Use Topics   •  "Alcohol use: Yes     Alcohol/week: 1.2 oz     Types: 2 Cans of beer per week     Comment: occasionally   • Drug use: No   ,     Medications  Current Outpatient Medications   Medication Sig Dispense Refill   • aspirin 81 MG chewable tablet Chew 1 tablet Daily.     • atorvastatin (LIPITOR) 80 MG tablet Take 1 tablet by mouth Every Evening. 90 tablet 3   • ibuprofen (ADVIL,MOTRIN) 200 MG tablet Take 200 mg by mouth Every 6 (Six) Hours As Needed for Mild Pain (1-3).     • lisinopril (PRINIVIL,ZESTRIL) 5 MG tablet Take 1 tablet by mouth Daily. 90 tablet 3   • metFORMIN (GLUCOPHAGE) 500 MG tablet Take 500 mg by mouth 2 (Two) Times a Day With Meals.     • metoprolol tartrate (LOPRESSOR) 50 MG tablet Take 1 tablet by mouth Every 12 (Twelve) Hours. 90 tablet 3   • Multiple Vitamins-Minerals (MULTIVITAMIN ADULT PO) Take  by mouth Daily.     • ferrous sulfate 325 (65 FE) MG tablet Take 325 mg by mouth Daily With Breakfast.     • Vitamin D, Cholecalciferol, (CHOLECALCIFEROL) 400 units tablet Take 400 Units by mouth Daily.       No current facility-administered medications for this visit.        Allergies:  Patient has no known allergies.    Review of Systems  Review of Systems   HENT: Negative for nosebleeds.    Cardiovascular: Negative for chest pain, claudication, dyspnea on exertion, irregular heartbeat, leg swelling, near-syncope, orthopnea, palpitations, paroxysmal nocturnal dyspnea and syncope.   Respiratory: Negative for cough, hemoptysis and shortness of breath.    Gastrointestinal: Negative for dysphagia, hematemesis and melena.   Genitourinary: Negative for hematuria.   All other systems reviewed and are negative.      Objective     Physical Exam:  /76 (BP Location: Left arm, Patient Position: Sitting, Cuff Size: Adult)   Pulse 62   Ht 182.9 cm (72\")   Wt 119 kg (263 lb)   SpO2 98%   BMI 35.67 kg/m²   Physical Exam   Constitutional: He is oriented to person, place, and time. He appears well-nourished. No " distress.   HENT:   Head: Normocephalic.   Eyes: No scleral icterus.   Neck: Normal range of motion. Neck supple.   Cardiovascular: Normal rate, regular rhythm and normal heart sounds. Exam reveals no gallop and no friction rub.   No murmur heard.  Pulmonary/Chest: Effort normal and breath sounds normal. No respiratory distress. He has no wheezes. He has no rales.   Abdominal: Soft. Bowel sounds are normal. He exhibits no distension. There is no tenderness.   Musculoskeletal: He exhibits no edema.   Neurological: He is alert and oriented to person, place, and time.   Skin: Skin is warm and dry. He is not diaphoretic. No erythema.   Psychiatric: He has a normal mood and affect. His behavior is normal.       Results Review:    ECG 12 Lead  Date/Time: 8/9/2019 9:24 AM  Performed by: Jose Rasmussen MD  Authorized by: Jose Rasmussen MD   Comparison: compared with previous ECG   Comparison to previous ECG: New anterolateral t wave inversion  Rhythm: sinus rhythm  Rate: normal  Conduction: conduction normal  ST Elevation: V1, V2, V3 and V4  T inversion: V1-V6  QRS axis: normal    Clinical impression: abnormal EKG            Orders Only on 04/23/2018   Component Date Value Ref Range Status   • Total Cholesterol 04/23/2018 161  130 - 200 mg/dL Final   • Triglycerides 04/23/2018 245* 0 - 149 mg/dL Final   • HDL Cholesterol 04/23/2018 34* >=40 mg/dL Final   • LDL Cholesterol  04/23/2018 88  0 - 99 mg/dL Final   • LDL/HDL Ratio 04/23/2018 2.29   Final       Assessment/Plan   Doug was seen today for follow-up, coronary artery disease, hypertension and hyperlipidemia.    Diagnoses and all orders for this visit:    Mixed hyperlipidemia, needs to have repeat profile. May need to be on a [id number]    Essential hypertension, good control    Coronary artery disease involving native coronary artery of native heart without angina pectoris, no chest pain but has new anterolateral T wave inversion. Will schedule stress test        Patient's  Body mass index is 35.67 kg/m². BMI is above normal parameters. Recommendations include: exercise counseling.

## 2019-08-22 ENCOUNTER — HOSPITAL ENCOUNTER (OUTPATIENT)
Facility: HOSPITAL | Age: 49
Discharge: HOME | End: 2019-08-24
Attending: EMERGENCY MEDICINE | Admitting: INTERNAL MEDICINE

## 2019-08-22 ENCOUNTER — APPOINTMENT (OUTPATIENT)
Dept: GENERAL RADIOLOGY | Facility: HOSPITAL | Age: 49
End: 2019-08-22

## 2019-08-22 DIAGNOSIS — G47.34 NOCTURNAL HYPOXIA: ICD-10-CM

## 2019-08-22 DIAGNOSIS — R07.9 CHEST PAIN, UNSPECIFIED TYPE: Primary | ICD-10-CM

## 2019-08-22 DIAGNOSIS — E66.01 CLASS 2 SEVERE OBESITY WITH SERIOUS COMORBIDITY AND BODY MASS INDEX (BMI) OF 36.0 TO 36.9 IN ADULT, UNSPECIFIED OBESITY TYPE (HCC): ICD-10-CM

## 2019-08-22 PROBLEM — E11.65 TYPE 2 DIABETES MELLITUS WITH HYPERGLYCEMIA, WITHOUT LONG-TERM CURRENT USE OF INSULIN: Status: ACTIVE | Noted: 2019-08-22

## 2019-08-22 LAB
ALBUMIN SERPL-MCNC: 4.7 G/DL (ref 3.5–5)
ALBUMIN/GLOB SERPL: 1.4 G/DL (ref 1.1–2.5)
ALP SERPL-CCNC: 68 U/L (ref 24–120)
ALT SERPL W P-5'-P-CCNC: 85 U/L (ref 0–54)
ANION GAP SERPL CALCULATED.3IONS-SCNC: 11 MMOL/L (ref 4–13)
APTT PPP: 22.9 SECONDS (ref 24.1–35)
AST SERPL-CCNC: 63 U/L (ref 7–45)
BASOPHILS # BLD AUTO: 0.06 10*3/MM3 (ref 0–0.2)
BASOPHILS NFR BLD AUTO: 0.6 % (ref 0–1.5)
BILIRUB SERPL-MCNC: 0.7 MG/DL (ref 0.1–1)
BUN BLD-MCNC: 12 MG/DL (ref 5–21)
BUN/CREAT SERPL: 19.7 (ref 7–25)
CALCIUM SPEC-SCNC: 9.9 MG/DL (ref 8.4–10.4)
CHLORIDE SERPL-SCNC: 101 MMOL/L (ref 98–110)
CO2 SERPL-SCNC: 26 MMOL/L (ref 24–31)
CREAT BLD-MCNC: 0.61 MG/DL (ref 0.5–1.4)
DEPRECATED RDW RBC AUTO: 41.6 FL (ref 37–54)
EOSINOPHIL # BLD AUTO: 0.32 10*3/MM3 (ref 0–0.4)
EOSINOPHIL NFR BLD AUTO: 3.3 % (ref 0.3–6.2)
ERYTHROCYTE [DISTWIDTH] IN BLOOD BY AUTOMATED COUNT: 12.9 % (ref 12.3–15.4)
GFR SERPL CREATININE-BSD FRML MDRD: 140 ML/MIN/1.73
GFR SERPL CREATININE-BSD FRML MDRD: >150 ML/MIN/1.73
GLOBULIN UR ELPH-MCNC: 3.3 GM/DL
GLUCOSE BLD-MCNC: 119 MG/DL (ref 70–100)
GLUCOSE BLDC GLUCOMTR-MCNC: 112 MG/DL (ref 70–130)
HCT VFR BLD AUTO: 46.5 % (ref 37.5–51)
HGB BLD-MCNC: 16.1 G/DL (ref 13–17.7)
HOLD SPECIMEN: NORMAL
HOLD SPECIMEN: NORMAL
IMM GRANULOCYTES # BLD AUTO: 0.07 10*3/MM3 (ref 0–0.05)
IMM GRANULOCYTES NFR BLD AUTO: 0.7 % (ref 0–0.5)
INR PPP: 0.9 (ref 0.91–1.09)
LYMPHOCYTES # BLD AUTO: 2.75 10*3/MM3 (ref 0.7–3.1)
LYMPHOCYTES NFR BLD AUTO: 28.8 % (ref 19.6–45.3)
MCH RBC QN AUTO: 30.6 PG (ref 26.6–33)
MCHC RBC AUTO-ENTMCNC: 34.6 G/DL (ref 31.5–35.7)
MCV RBC AUTO: 88.2 FL (ref 79–97)
MONOCYTES # BLD AUTO: 0.69 10*3/MM3 (ref 0.1–0.9)
MONOCYTES NFR BLD AUTO: 7.2 % (ref 5–12)
NEUTROPHILS # BLD AUTO: 5.67 10*3/MM3 (ref 1.7–7)
NEUTROPHILS NFR BLD AUTO: 59.4 % (ref 42.7–76)
NRBC BLD AUTO-RTO: 0 /100 WBC (ref 0–0.2)
PLATELET # BLD AUTO: 316 10*3/MM3 (ref 140–450)
PMV BLD AUTO: 9.7 FL (ref 6–12)
POTASSIUM BLD-SCNC: 4.6 MMOL/L (ref 3.5–5.3)
PROT SERPL-MCNC: 8 G/DL (ref 6.3–8.7)
PROTHROMBIN TIME: 12.4 SECONDS (ref 11.9–14.6)
RBC # BLD AUTO: 5.27 10*6/MM3 (ref 4.14–5.8)
SODIUM BLD-SCNC: 138 MMOL/L (ref 135–145)
TROPONIN I SERPL-MCNC: <0.012 NG/ML (ref 0–0.03)
TROPONIN I SERPL-MCNC: <0.012 NG/ML (ref 0–0.03)
WBC NRBC COR # BLD: 9.56 10*3/MM3 (ref 3.4–10.8)
WHOLE BLOOD HOLD SPECIMEN: NORMAL
WHOLE BLOOD HOLD SPECIMEN: NORMAL

## 2019-08-22 PROCEDURE — 80053 COMPREHEN METABOLIC PANEL: CPT | Performed by: EMERGENCY MEDICINE

## 2019-08-22 PROCEDURE — 71046 X-RAY EXAM CHEST 2 VIEWS: CPT

## 2019-08-22 PROCEDURE — 93005 ELECTROCARDIOGRAM TRACING: CPT | Performed by: EMERGENCY MEDICINE

## 2019-08-22 PROCEDURE — 82962 GLUCOSE BLOOD TEST: CPT

## 2019-08-22 PROCEDURE — 94799 UNLISTED PULMONARY SVC/PX: CPT

## 2019-08-22 PROCEDURE — G0378 HOSPITAL OBSERVATION PER HR: HCPCS

## 2019-08-22 PROCEDURE — 84484 ASSAY OF TROPONIN QUANT: CPT | Performed by: NURSE PRACTITIONER

## 2019-08-22 PROCEDURE — 85730 THROMBOPLASTIN TIME PARTIAL: CPT | Performed by: EMERGENCY MEDICINE

## 2019-08-22 PROCEDURE — 93010 ELECTROCARDIOGRAM REPORT: CPT | Performed by: INTERNAL MEDICINE

## 2019-08-22 PROCEDURE — 85025 COMPLETE CBC W/AUTO DIFF WBC: CPT | Performed by: EMERGENCY MEDICINE

## 2019-08-22 PROCEDURE — 85610 PROTHROMBIN TIME: CPT | Performed by: EMERGENCY MEDICINE

## 2019-08-22 PROCEDURE — 94760 N-INVAS EAR/PLS OXIMETRY 1: CPT

## 2019-08-22 PROCEDURE — 99284 EMERGENCY DEPT VISIT MOD MDM: CPT

## 2019-08-22 PROCEDURE — 25010000002 ENOXAPARIN PER 10 MG: Performed by: NURSE PRACTITIONER

## 2019-08-22 PROCEDURE — 96372 THER/PROPH/DIAG INJ SC/IM: CPT

## 2019-08-22 PROCEDURE — 84484 ASSAY OF TROPONIN QUANT: CPT | Performed by: EMERGENCY MEDICINE

## 2019-08-22 RX ORDER — ONDANSETRON 2 MG/ML
4 INJECTION INTRAMUSCULAR; INTRAVENOUS EVERY 6 HOURS PRN
Status: DISCONTINUED | OUTPATIENT
Start: 2019-08-22 | End: 2019-08-23 | Stop reason: SDUPTHER

## 2019-08-22 RX ORDER — NITROGLYCERIN 0.4 MG/1
0.4 TABLET SUBLINGUAL
Status: DISCONTINUED | OUTPATIENT
Start: 2019-08-22 | End: 2019-08-24 | Stop reason: HOSPADM

## 2019-08-22 RX ORDER — ACETAMINOPHEN 325 MG/1
650 TABLET ORAL EVERY 4 HOURS PRN
Status: DISCONTINUED | OUTPATIENT
Start: 2019-08-22 | End: 2019-08-23 | Stop reason: SDUPTHER

## 2019-08-22 RX ORDER — ACETAMINOPHEN 650 MG/1
650 SUPPOSITORY RECTAL EVERY 4 HOURS PRN
Status: DISCONTINUED | OUTPATIENT
Start: 2019-08-22 | End: 2019-08-23 | Stop reason: SDUPTHER

## 2019-08-22 RX ORDER — ACETAMINOPHEN 160 MG/5ML
650 SOLUTION ORAL EVERY 4 HOURS PRN
Status: DISCONTINUED | OUTPATIENT
Start: 2019-08-22 | End: 2019-08-23 | Stop reason: SDUPTHER

## 2019-08-22 RX ORDER — METOPROLOL TARTRATE 50 MG/1
50 TABLET, FILM COATED ORAL EVERY 12 HOURS SCHEDULED
Status: DISCONTINUED | OUTPATIENT
Start: 2019-08-22 | End: 2019-08-24 | Stop reason: HOSPADM

## 2019-08-22 RX ORDER — ATORVASTATIN CALCIUM 40 MG/1
80 TABLET, FILM COATED ORAL EVERY EVENING
Status: DISCONTINUED | OUTPATIENT
Start: 2019-08-22 | End: 2019-08-24 | Stop reason: HOSPADM

## 2019-08-22 RX ORDER — SODIUM CHLORIDE 0.9 % (FLUSH) 0.9 %
10 SYRINGE (ML) INJECTION AS NEEDED
Status: DISCONTINUED | OUTPATIENT
Start: 2019-08-22 | End: 2019-08-24 | Stop reason: HOSPADM

## 2019-08-22 RX ORDER — DEXTROSE MONOHYDRATE 25 G/50ML
25 INJECTION, SOLUTION INTRAVENOUS
Status: DISCONTINUED | OUTPATIENT
Start: 2019-08-22 | End: 2019-08-24 | Stop reason: HOSPADM

## 2019-08-22 RX ORDER — SODIUM CHLORIDE 0.9 % (FLUSH) 0.9 %
3-10 SYRINGE (ML) INJECTION AS NEEDED
Status: DISCONTINUED | OUTPATIENT
Start: 2019-08-22 | End: 2019-08-24 | Stop reason: HOSPADM

## 2019-08-22 RX ORDER — ONDANSETRON 4 MG/1
4 TABLET, FILM COATED ORAL EVERY 6 HOURS PRN
Status: DISCONTINUED | OUTPATIENT
Start: 2019-08-22 | End: 2019-08-23 | Stop reason: SDUPTHER

## 2019-08-22 RX ORDER — ASPIRIN 81 MG/1
81 TABLET, CHEWABLE ORAL DAILY
Status: DISCONTINUED | OUTPATIENT
Start: 2019-08-23 | End: 2019-08-24 | Stop reason: HOSPADM

## 2019-08-22 RX ORDER — SODIUM CHLORIDE 0.9 % (FLUSH) 0.9 %
3 SYRINGE (ML) INJECTION EVERY 12 HOURS SCHEDULED
Status: DISCONTINUED | OUTPATIENT
Start: 2019-08-22 | End: 2019-08-24 | Stop reason: HOSPADM

## 2019-08-22 RX ORDER — NICOTINE POLACRILEX 4 MG
15 LOZENGE BUCCAL
Status: DISCONTINUED | OUTPATIENT
Start: 2019-08-22 | End: 2019-08-24 | Stop reason: HOSPADM

## 2019-08-22 RX ORDER — ASPIRIN 81 MG/1
324 TABLET, CHEWABLE ORAL ONCE
Status: COMPLETED | OUTPATIENT
Start: 2019-08-22 | End: 2019-08-22

## 2019-08-22 RX ORDER — LISINOPRIL 5 MG/1
5 TABLET ORAL DAILY
Status: DISCONTINUED | OUTPATIENT
Start: 2019-08-22 | End: 2019-08-24 | Stop reason: HOSPADM

## 2019-08-22 RX ADMIN — ACETAMINOPHEN 650 MG: 325 TABLET, FILM COATED ORAL at 20:14

## 2019-08-22 RX ADMIN — ENOXAPARIN SODIUM 120 MG: 120 INJECTION SUBCUTANEOUS at 20:15

## 2019-08-22 RX ADMIN — SODIUM CHLORIDE 500 ML: 9 INJECTION, SOLUTION INTRAVENOUS at 15:10

## 2019-08-22 RX ADMIN — NITROGLYCERIN 1 INCH: 20 OINTMENT TOPICAL at 15:11

## 2019-08-22 RX ADMIN — ATORVASTATIN CALCIUM 80 MG: 40 TABLET, FILM COATED ORAL at 20:14

## 2019-08-22 RX ADMIN — METOPROLOL TARTRATE 50 MG: 50 TABLET ORAL at 20:15

## 2019-08-22 RX ADMIN — ASPIRIN 81 MG 324 MG: 81 TABLET ORAL at 14:58

## 2019-08-23 ENCOUNTER — APPOINTMENT (OUTPATIENT)
Dept: CARDIOLOGY | Facility: HOSPITAL | Age: 49
End: 2019-08-23

## 2019-08-23 LAB
ANION GAP SERPL CALCULATED.3IONS-SCNC: 10 MMOL/L (ref 4–13)
ARTICHOKE IGE QN: 81 MG/DL (ref 0–99)
BH CV ECHO MEAS - AO MAX PG (FULL): 3.4 MMHG
BH CV ECHO MEAS - AO MAX PG: 5.5 MMHG
BH CV ECHO MEAS - AO MEAN PG (FULL): 2 MMHG
BH CV ECHO MEAS - AO MEAN PG: 3 MMHG
BH CV ECHO MEAS - AO ROOT AREA (BSA CORRECTED): 1.7
BH CV ECHO MEAS - AO ROOT AREA: 13.2 CM^2
BH CV ECHO MEAS - AO ROOT DIAM: 4.1 CM
BH CV ECHO MEAS - AO V2 MAX: 117 CM/SEC
BH CV ECHO MEAS - AO V2 MEAN: 79 CM/SEC
BH CV ECHO MEAS - AO V2 VTI: 27.4 CM
BH CV ECHO MEAS - AVA(I,A): 2.6 CM^2
BH CV ECHO MEAS - AVA(I,D): 2.6 CM^2
BH CV ECHO MEAS - AVA(V,A): 2.3 CM^2
BH CV ECHO MEAS - AVA(V,D): 2.3 CM^2
BH CV ECHO MEAS - BSA(HAYCOCK): 2.5 M^2
BH CV ECHO MEAS - BSA: 2.4 M^2
BH CV ECHO MEAS - BZI_BMI: 35.8 KILOGRAMS/M^2
BH CV ECHO MEAS - BZI_METRIC_HEIGHT: 182.9 CM
BH CV ECHO MEAS - BZI_METRIC_WEIGHT: 119.8 KG
BH CV ECHO MEAS - EDV(CUBED): 138.2 ML
BH CV ECHO MEAS - EDV(MOD-SP2): 124 ML
BH CV ECHO MEAS - EDV(MOD-SP4): 193 ML
BH CV ECHO MEAS - EDV(TEICH): 127.8 ML
BH CV ECHO MEAS - EF(CUBED): 66 %
BH CV ECHO MEAS - EF(MOD-SP2): 46.5 %
BH CV ECHO MEAS - EF(MOD-SP4): 46.6 %
BH CV ECHO MEAS - EF(TEICH): 57.1 %
BH CV ECHO MEAS - ESV(CUBED): 47 ML
BH CV ECHO MEAS - ESV(MOD-SP2): 66.3 ML
BH CV ECHO MEAS - ESV(MOD-SP4): 103 ML
BH CV ECHO MEAS - ESV(TEICH): 54.8 ML
BH CV ECHO MEAS - FS: 30.2 %
BH CV ECHO MEAS - IVS/LVPW: 0.98
BH CV ECHO MEAS - IVSD: 1.4 CM
BH CV ECHO MEAS - LA DIMENSION: 3.8 CM
BH CV ECHO MEAS - LA/AO: 0.93
BH CV ECHO MEAS - LAT PEAK E' VEL: 7.5 CM/SEC
BH CV ECHO MEAS - LV DIASTOLIC VOL/BSA (35-75): 80.5 ML/M^2
BH CV ECHO MEAS - LV MASS(C)D: 305.3 GRAMS
BH CV ECHO MEAS - LV MASS(C)DI: 127.3 GRAMS/M^2
BH CV ECHO MEAS - LV MAX PG: 2.1 MMHG
BH CV ECHO MEAS - LV MEAN PG: 1 MMHG
BH CV ECHO MEAS - LV SYSTOLIC VOL/BSA (12-30): 43 ML/M^2
BH CV ECHO MEAS - LV V1 MAX: 71.7 CM/SEC
BH CV ECHO MEAS - LV V1 MEAN: 55 CM/SEC
BH CV ECHO MEAS - LV V1 VTI: 18.8 CM
BH CV ECHO MEAS - LVIDD: 5.6 CM
BH CV ECHO MEAS - LVIDS: 3.6 CM
BH CV ECHO MEAS - LVLD AP2: 9.3 CM
BH CV ECHO MEAS - LVLD AP4: 9.6 CM
BH CV ECHO MEAS - LVLS AP2: 9 CM
BH CV ECHO MEAS - LVLS AP4: 8.6 CM
BH CV ECHO MEAS - LVOT AREA (M): 3.8 CM^2
BH CV ECHO MEAS - LVOT AREA: 3.8 CM^2
BH CV ECHO MEAS - LVOT DIAM: 2.2 CM
BH CV ECHO MEAS - LVPWD: 1.4 CM
BH CV ECHO MEAS - MED PEAK E' VEL: 6.6 CM/SEC
BH CV ECHO MEAS - MR MAX PG: 60.2 MMHG
BH CV ECHO MEAS - MR MAX VEL: 388 CM/SEC
BH CV ECHO MEAS - MR MEAN PG: 47 MMHG
BH CV ECHO MEAS - MR MEAN VEL: 332 CM/SEC
BH CV ECHO MEAS - MR VTI: 160 CM
BH CV ECHO MEAS - MV A MAX VEL: 41.2 CM/SEC
BH CV ECHO MEAS - MV DEC SLOPE: 448 CM/SEC^2
BH CV ECHO MEAS - MV DEC TIME: 0.18 SEC
BH CV ECHO MEAS - MV E MAX VEL: 80.5 CM/SEC
BH CV ECHO MEAS - MV E/A: 2
BH CV ECHO MEAS - RAP SYSTOLE: 10 MMHG
BH CV ECHO MEAS - RVSP: 31.5 MMHG
BH CV ECHO MEAS - SI(AO): 150.9 ML/M^2
BH CV ECHO MEAS - SI(CUBED): 38 ML/M^2
BH CV ECHO MEAS - SI(LVOT): 29.8 ML/M^2
BH CV ECHO MEAS - SI(MOD-SP2): 24.1 ML/M^2
BH CV ECHO MEAS - SI(MOD-SP4): 37.5 ML/M^2
BH CV ECHO MEAS - SI(TEICH): 30.4 ML/M^2
BH CV ECHO MEAS - SV(AO): 361.8 ML
BH CV ECHO MEAS - SV(CUBED): 91.1 ML
BH CV ECHO MEAS - SV(LVOT): 71.5 ML
BH CV ECHO MEAS - SV(MOD-SP2): 57.7 ML
BH CV ECHO MEAS - SV(MOD-SP4): 90 ML
BH CV ECHO MEAS - SV(TEICH): 73 ML
BH CV ECHO MEAS - TR MAX VEL: 232 CM/SEC
BH CV ECHO MEASUREMENTS AVERAGE E/E' RATIO: 11.42
BH CV STRESS BP STAGE 1: NORMAL
BH CV STRESS BP STAGE 2: NORMAL
BH CV STRESS BP STAGE 3: NORMAL
BH CV STRESS BP STAGE 4: NORMAL
BH CV STRESS DOB - ATROPINE STAGE 3: 1
BH CV STRESS DOB - ATROPINE STAGE 4: 1
BH CV STRESS DOSE DOBUTAMINE STAGE 1: 10
BH CV STRESS DOSE DOBUTAMINE STAGE 2: 20
BH CV STRESS DOSE DOBUTAMINE STAGE 3: 30
BH CV STRESS DOSE DOBUTAMINE STAGE 4: 40
BH CV STRESS DOSE DOBUTAMINE STAGE 5: 50
BH CV STRESS DURATION MIN STAGE 1: 3
BH CV STRESS DURATION MIN STAGE 2: 3
BH CV STRESS DURATION MIN STAGE 3: 3
BH CV STRESS DURATION MIN STAGE 4: 3
BH CV STRESS DURATION MIN STAGE 5: 2
BH CV STRESS DURATION SEC STAGE 1: 0
BH CV STRESS DURATION SEC STAGE 2: 0
BH CV STRESS DURATION SEC STAGE 3: 0
BH CV STRESS DURATION SEC STAGE 4: 0
BH CV STRESS DURATION SEC STAGE 5: 17
BH CV STRESS ECHO POST STRESS EJECTION FRACTION EF: 45 %
BH CV STRESS HR STAGE 1: 61
BH CV STRESS HR STAGE 2: 58
BH CV STRESS HR STAGE 3: 62
BH CV STRESS HR STAGE 4: 138
BH CV STRESS HR STAGE 5: 148
BH CV STRESS PROTOCOL 1: NORMAL
BH CV STRESS RECOVERY BP: NORMAL MMHG
BH CV STRESS RECOVERY HR: 95 BPM
BH CV STRESS STAGE 1: 1
BH CV STRESS STAGE 2: 2
BH CV STRESS STAGE 3: 3
BH CV STRESS STAGE 4: 4
BH CV STRESS STAGE 5: 5
BUN BLD-MCNC: 11 MG/DL (ref 5–21)
BUN/CREAT SERPL: 20.4 (ref 7–25)
CALCIUM SPEC-SCNC: 9.1 MG/DL (ref 8.4–10.4)
CHLORIDE SERPL-SCNC: 103 MMOL/L (ref 98–110)
CHOLEST SERPL-MCNC: 158 MG/DL (ref 130–200)
CO2 SERPL-SCNC: 24 MMOL/L (ref 24–31)
CREAT BLD-MCNC: 0.54 MG/DL (ref 0.5–1.4)
DEPRECATED RDW RBC AUTO: 41.2 FL (ref 37–54)
ERYTHROCYTE [DISTWIDTH] IN BLOOD BY AUTOMATED COUNT: 12.8 % (ref 12.3–15.4)
GFR SERPL CREATININE-BSD FRML MDRD: >150 ML/MIN/1.73
GFR SERPL CREATININE-BSD FRML MDRD: >150 ML/MIN/1.73
GLUCOSE BLD-MCNC: 108 MG/DL (ref 70–100)
GLUCOSE BLDC GLUCOMTR-MCNC: 127 MG/DL (ref 70–130)
GLUCOSE BLDC GLUCOMTR-MCNC: 128 MG/DL (ref 70–130)
GLUCOSE BLDC GLUCOMTR-MCNC: 247 MG/DL (ref 70–130)
GLUCOSE BLDC GLUCOMTR-MCNC: 97 MG/DL (ref 70–130)
HBA1C MFR BLD: 6.4 % (ref 4.8–5.9)
HCT VFR BLD AUTO: 41.9 % (ref 37.5–51)
HDLC SERPL-MCNC: 32 MG/DL
HGB BLD-MCNC: 14.6 G/DL (ref 13–17.7)
LDLC/HDLC SERPL: ABNORMAL {RATIO}
LEFT ATRIUM VOLUME INDEX: 30.3 ML/M2
LEFT ATRIUM VOLUME: 72.6 CM3
LV EF 2D ECHO EST: 45 %
LV EF 2D ECHO EST: 45 %
MAXIMAL PREDICTED HEART RATE: 171 BPM
MAXIMAL PREDICTED HEART RATE: 171 BPM
MCH RBC QN AUTO: 30.6 PG (ref 26.6–33)
MCHC RBC AUTO-ENTMCNC: 34.8 G/DL (ref 31.5–35.7)
MCV RBC AUTO: 87.8 FL (ref 79–97)
PERCENT MAX PREDICTED HR: 86.55 %
PLATELET # BLD AUTO: 277 10*3/MM3 (ref 140–450)
PMV BLD AUTO: 10.6 FL (ref 6–12)
POTASSIUM BLD-SCNC: 4.2 MMOL/L (ref 3.5–5.3)
RBC # BLD AUTO: 4.77 10*6/MM3 (ref 4.14–5.8)
SODIUM BLD-SCNC: 137 MMOL/L (ref 135–145)
STRESS BASELINE BP: NORMAL MMHG
STRESS BASELINE HR: 61 BPM
STRESS PERCENT HR: 102 %
STRESS POST EXERCISE DUR MIN: 14 MIN
STRESS POST EXERCISE DUR SEC: 17 SEC
STRESS POST PEAK BP: NORMAL MMHG
STRESS POST PEAK HR: 148 BPM
STRESS TARGET HR: 145 BPM
STRESS TARGET HR: 145 BPM
TRIGL SERPL-MCNC: 583 MG/DL (ref 0–149)
TROPONIN I SERPL-MCNC: <0.012 NG/ML (ref 0–0.03)
TSH SERPL DL<=0.05 MIU/L-ACNC: 4.14 MIU/ML (ref 0.47–4.68)
WBC NRBC COR # BLD: 7.92 10*3/MM3 (ref 3.4–10.8)

## 2019-08-23 PROCEDURE — 80048 BASIC METABOLIC PNL TOTAL CA: CPT | Performed by: NURSE PRACTITIONER

## 2019-08-23 PROCEDURE — 99215 OFFICE O/P EST HI 40 MIN: CPT | Performed by: INTERNAL MEDICINE

## 2019-08-23 PROCEDURE — 82962 GLUCOSE BLOOD TEST: CPT

## 2019-08-23 PROCEDURE — 85027 COMPLETE CBC AUTOMATED: CPT | Performed by: NURSE PRACTITIONER

## 2019-08-23 PROCEDURE — 83036 HEMOGLOBIN GLYCOSYLATED A1C: CPT | Performed by: NURSE PRACTITIONER

## 2019-08-23 PROCEDURE — C1769 GUIDE WIRE: HCPCS | Performed by: INTERNAL MEDICINE

## 2019-08-23 PROCEDURE — 93350 STRESS TTE ONLY: CPT

## 2019-08-23 PROCEDURE — C1760 CLOSURE DEV, VASC: HCPCS | Performed by: INTERNAL MEDICINE

## 2019-08-23 PROCEDURE — 94799 UNLISTED PULMONARY SVC/PX: CPT

## 2019-08-23 PROCEDURE — 84443 ASSAY THYROID STIM HORMONE: CPT | Performed by: NURSE PRACTITIONER

## 2019-08-23 PROCEDURE — 0 IOPAMIDOL PER 1 ML: Performed by: INTERNAL MEDICINE

## 2019-08-23 PROCEDURE — 96372 THER/PROPH/DIAG INJ SC/IM: CPT

## 2019-08-23 PROCEDURE — 94762 N-INVAS EAR/PLS OXIMTRY CONT: CPT

## 2019-08-23 PROCEDURE — 25010000002 PERFLUTREN 6.52 MG/ML SUSPENSION: Performed by: INTERNAL MEDICINE

## 2019-08-23 PROCEDURE — 93306 TTE W/DOPPLER COMPLETE: CPT

## 2019-08-23 PROCEDURE — C1894 INTRO/SHEATH, NON-LASER: HCPCS | Performed by: INTERNAL MEDICINE

## 2019-08-23 PROCEDURE — 93352 ADMIN ECG CONTRAST AGENT: CPT | Performed by: INTERNAL MEDICINE

## 2019-08-23 PROCEDURE — 25010000003 DOBUTAMINE PER 250 MG: Performed by: INTERNAL MEDICINE

## 2019-08-23 PROCEDURE — 25010000002 MIDAZOLAM PER 1 MG: Performed by: INTERNAL MEDICINE

## 2019-08-23 PROCEDURE — G0378 HOSPITAL OBSERVATION PER HR: HCPCS

## 2019-08-23 PROCEDURE — 93306 TTE W/DOPPLER COMPLETE: CPT | Performed by: INTERNAL MEDICINE

## 2019-08-23 PROCEDURE — 99153 MOD SED SAME PHYS/QHP EA: CPT | Performed by: INTERNAL MEDICINE

## 2019-08-23 PROCEDURE — 96360 HYDRATION IV INFUSION INIT: CPT

## 2019-08-23 PROCEDURE — 63710000001 INSULIN LISPRO (HUMAN) PER 5 UNITS: Performed by: NURSE PRACTITIONER

## 2019-08-23 PROCEDURE — 93458 L HRT ARTERY/VENTRICLE ANGIO: CPT | Performed by: INTERNAL MEDICINE

## 2019-08-23 PROCEDURE — 99152 MOD SED SAME PHYS/QHP 5/>YRS: CPT | Performed by: INTERNAL MEDICINE

## 2019-08-23 PROCEDURE — 80061 LIPID PANEL: CPT | Performed by: NURSE PRACTITIONER

## 2019-08-23 PROCEDURE — 25010000002 FENTANYL CITRATE (PF) 100 MCG/2ML SOLUTION: Performed by: INTERNAL MEDICINE

## 2019-08-23 PROCEDURE — 25010000002 DIPHENHYDRAMINE PER 50 MG: Performed by: INTERNAL MEDICINE

## 2019-08-23 PROCEDURE — 25010000002 HEPARIN (PORCINE): Performed by: INTERNAL MEDICINE

## 2019-08-23 PROCEDURE — 93350 STRESS TTE ONLY: CPT | Performed by: INTERNAL MEDICINE

## 2019-08-23 PROCEDURE — 25010000002 ENOXAPARIN PER 10 MG: Performed by: NURSE PRACTITIONER

## 2019-08-23 PROCEDURE — 94760 N-INVAS EAR/PLS OXIMETRY 1: CPT

## 2019-08-23 PROCEDURE — 93017 CV STRESS TEST TRACING ONLY: CPT

## 2019-08-23 PROCEDURE — 93018 CV STRESS TEST I&R ONLY: CPT | Performed by: INTERNAL MEDICINE

## 2019-08-23 RX ORDER — FENTANYL CITRATE 50 UG/ML
INJECTION, SOLUTION INTRAMUSCULAR; INTRAVENOUS AS NEEDED
Status: DISCONTINUED | OUTPATIENT
Start: 2019-08-23 | End: 2019-08-23 | Stop reason: HOSPADM

## 2019-08-23 RX ORDER — SENNA AND DOCUSATE SODIUM 50; 8.6 MG/1; MG/1
2 TABLET, FILM COATED ORAL NIGHTLY
Status: DISCONTINUED | OUTPATIENT
Start: 2019-08-23 | End: 2019-08-24 | Stop reason: HOSPADM

## 2019-08-23 RX ORDER — NALOXONE HCL 0.4 MG/ML
0.4 VIAL (ML) INJECTION
Status: DISCONTINUED | OUTPATIENT
Start: 2019-08-23 | End: 2019-08-24 | Stop reason: HOSPADM

## 2019-08-23 RX ORDER — SODIUM CHLORIDE 9 MG/ML
80 INJECTION, SOLUTION INTRAVENOUS CONTINUOUS
Status: DISCONTINUED | OUTPATIENT
Start: 2019-08-23 | End: 2019-08-24 | Stop reason: HOSPADM

## 2019-08-23 RX ORDER — ONDANSETRON 2 MG/ML
4 INJECTION INTRAMUSCULAR; INTRAVENOUS EVERY 6 HOURS PRN
Status: DISCONTINUED | OUTPATIENT
Start: 2019-08-23 | End: 2019-08-24 | Stop reason: HOSPADM

## 2019-08-23 RX ORDER — DIPHENHYDRAMINE HCL 25 MG
25 CAPSULE ORAL EVERY 6 HOURS PRN
Status: DISCONTINUED | OUTPATIENT
Start: 2019-08-23 | End: 2019-08-24 | Stop reason: HOSPADM

## 2019-08-23 RX ORDER — TEMAZEPAM 7.5 MG/1
7.5 CAPSULE ORAL NIGHTLY PRN
Status: DISCONTINUED | OUTPATIENT
Start: 2019-08-23 | End: 2019-08-24 | Stop reason: HOSPADM

## 2019-08-23 RX ORDER — CLOPIDOGREL BISULFATE 75 MG/1
300 TABLET ORAL ONCE
Status: COMPLETED | OUTPATIENT
Start: 2019-08-23 | End: 2019-08-23

## 2019-08-23 RX ORDER — ALPRAZOLAM 0.5 MG/1
0.5 TABLET ORAL 3 TIMES DAILY PRN
Status: DISCONTINUED | OUTPATIENT
Start: 2019-08-23 | End: 2019-08-24 | Stop reason: HOSPADM

## 2019-08-23 RX ORDER — MIDAZOLAM HYDROCHLORIDE 1 MG/ML
INJECTION INTRAMUSCULAR; INTRAVENOUS AS NEEDED
Status: DISCONTINUED | OUTPATIENT
Start: 2019-08-23 | End: 2019-08-23 | Stop reason: HOSPADM

## 2019-08-23 RX ORDER — LIDOCAINE HYDROCHLORIDE 20 MG/ML
INJECTION, SOLUTION INFILTRATION; PERINEURAL AS NEEDED
Status: DISCONTINUED | OUTPATIENT
Start: 2019-08-23 | End: 2019-08-23 | Stop reason: HOSPADM

## 2019-08-23 RX ORDER — DIPHENHYDRAMINE HYDROCHLORIDE 50 MG/ML
INJECTION INTRAMUSCULAR; INTRAVENOUS AS NEEDED
Status: DISCONTINUED | OUTPATIENT
Start: 2019-08-23 | End: 2019-08-23 | Stop reason: HOSPADM

## 2019-08-23 RX ORDER — SODIUM CHLORIDE 9 MG/ML
100 INJECTION, SOLUTION INTRAVENOUS CONTINUOUS
Status: DISCONTINUED | OUTPATIENT
Start: 2019-08-23 | End: 2019-08-23 | Stop reason: HOSPADM

## 2019-08-23 RX ORDER — CLOPIDOGREL BISULFATE 75 MG/1
75 TABLET ORAL DAILY
Status: DISCONTINUED | OUTPATIENT
Start: 2019-08-24 | End: 2019-08-24 | Stop reason: HOSPADM

## 2019-08-23 RX ORDER — MORPHINE SULFATE 2 MG/ML
1 INJECTION, SOLUTION INTRAMUSCULAR; INTRAVENOUS EVERY 4 HOURS PRN
Status: DISCONTINUED | OUTPATIENT
Start: 2019-08-23 | End: 2019-08-24 | Stop reason: HOSPADM

## 2019-08-23 RX ORDER — HYDROCODONE BITARTRATE AND ACETAMINOPHEN 5; 325 MG/1; MG/1
1 TABLET ORAL EVERY 4 HOURS PRN
Status: DISCONTINUED | OUTPATIENT
Start: 2019-08-23 | End: 2019-08-24 | Stop reason: HOSPADM

## 2019-08-23 RX ORDER — DOBUTAMINE HYDROCHLORIDE 100 MG/100ML
10 INJECTION INTRAVENOUS
Status: DISCONTINUED | OUTPATIENT
Start: 2019-08-23 | End: 2019-08-23 | Stop reason: HOSPADM

## 2019-08-23 RX ORDER — ONDANSETRON 4 MG/1
4 TABLET, FILM COATED ORAL EVERY 6 HOURS PRN
Status: DISCONTINUED | OUTPATIENT
Start: 2019-08-23 | End: 2019-08-24 | Stop reason: HOSPADM

## 2019-08-23 RX ORDER — ACETAMINOPHEN 325 MG/1
650 TABLET ORAL EVERY 4 HOURS PRN
Status: DISCONTINUED | OUTPATIENT
Start: 2019-08-23 | End: 2019-08-24 | Stop reason: HOSPADM

## 2019-08-23 RX ORDER — CALCIUM CARBONATE 200(500)MG
1 TABLET,CHEWABLE ORAL 2 TIMES DAILY PRN
Status: DISCONTINUED | OUTPATIENT
Start: 2019-08-23 | End: 2019-08-24 | Stop reason: HOSPADM

## 2019-08-23 RX ADMIN — SODIUM CHLORIDE, PRESERVATIVE FREE 3 ML: 5 INJECTION INTRAVENOUS at 20:40

## 2019-08-23 RX ADMIN — SENNOSIDES, DOCUSATE SODIUM 2 TABLET: 50; 8.6 TABLET, FILM COATED ORAL at 20:40

## 2019-08-23 RX ADMIN — ENOXAPARIN SODIUM 120 MG: 120 INJECTION SUBCUTANEOUS at 06:49

## 2019-08-23 RX ADMIN — CLOPIDOGREL BISULFATE 300 MG: 75 TABLET, FILM COATED ORAL at 17:55

## 2019-08-23 RX ADMIN — LISINOPRIL 5 MG: 5 TABLET ORAL at 08:54

## 2019-08-23 RX ADMIN — SODIUM CHLORIDE 100 ML/HR: 9 INJECTION, SOLUTION INTRAVENOUS at 14:45

## 2019-08-23 RX ADMIN — SODIUM CHLORIDE, PRESERVATIVE FREE 3 ML: 5 INJECTION INTRAVENOUS at 08:54

## 2019-08-23 RX ADMIN — ATORVASTATIN CALCIUM 80 MG: 40 TABLET, FILM COATED ORAL at 17:15

## 2019-08-23 RX ADMIN — METOPROLOL TARTRATE 50 MG: 50 TABLET ORAL at 08:52

## 2019-08-23 RX ADMIN — SODIUM CHLORIDE 100 ML/HR: 9 INJECTION, SOLUTION INTRAVENOUS at 12:35

## 2019-08-23 RX ADMIN — METOPROLOL TARTRATE 50 MG: 50 TABLET ORAL at 20:40

## 2019-08-23 RX ADMIN — INSULIN LISPRO 3 UNITS: 100 INJECTION, SOLUTION INTRAVENOUS; SUBCUTANEOUS at 20:39

## 2019-08-23 RX ADMIN — Medication 10 MCG/KG/MIN: at 09:47

## 2019-08-23 RX ADMIN — ASPIRIN 81 MG: 81 TABLET, CHEWABLE ORAL at 08:54

## 2019-08-23 RX ADMIN — PERFLUTREN 8.48 MG: 6.52 INJECTION, SUSPENSION INTRAVENOUS at 09:46

## 2019-08-23 RX ADMIN — SODIUM CHLORIDE 80 ML/HR: 9 INJECTION, SOLUTION INTRAVENOUS at 17:15

## 2019-08-23 RX ADMIN — ATROPINE SULFATE 2 MG: 0.1 INJECTION, SOLUTION INTRAVENOUS at 10:44

## 2019-08-24 VITALS
SYSTOLIC BLOOD PRESSURE: 111 MMHG | OXYGEN SATURATION: 94 % | RESPIRATION RATE: 18 BRPM | TEMPERATURE: 97.2 F | DIASTOLIC BLOOD PRESSURE: 78 MMHG | WEIGHT: 260 LBS | BODY MASS INDEX: 35.21 KG/M2 | HEIGHT: 72 IN | HEART RATE: 60 BPM

## 2019-08-24 LAB
ANION GAP SERPL CALCULATED.3IONS-SCNC: 9 MMOL/L (ref 4–13)
BUN BLD-MCNC: 8 MG/DL (ref 5–21)
BUN/CREAT SERPL: 13.3 (ref 7–25)
CALCIUM SPEC-SCNC: 9 MG/DL (ref 8.4–10.4)
CHLORIDE SERPL-SCNC: 105 MMOL/L (ref 98–110)
CO2 SERPL-SCNC: 25 MMOL/L (ref 24–31)
CREAT BLD-MCNC: 0.6 MG/DL (ref 0.5–1.4)
DEPRECATED RDW RBC AUTO: 41.8 FL (ref 37–54)
ERYTHROCYTE [DISTWIDTH] IN BLOOD BY AUTOMATED COUNT: 12.8 % (ref 12.3–15.4)
GFR SERPL CREATININE-BSD FRML MDRD: 143 ML/MIN/1.73
GFR SERPL CREATININE-BSD FRML MDRD: >150 ML/MIN/1.73
GLUCOSE BLD-MCNC: 114 MG/DL (ref 70–100)
GLUCOSE BLDC GLUCOMTR-MCNC: 130 MG/DL (ref 70–130)
HCT VFR BLD AUTO: 43.7 % (ref 37.5–51)
HGB BLD-MCNC: 14.7 G/DL (ref 13–17.7)
MCH RBC QN AUTO: 30.4 PG (ref 26.6–33)
MCHC RBC AUTO-ENTMCNC: 33.6 G/DL (ref 31.5–35.7)
MCV RBC AUTO: 90.3 FL (ref 79–97)
PLATELET # BLD AUTO: 265 10*3/MM3 (ref 140–450)
PMV BLD AUTO: 10.2 FL (ref 6–12)
POTASSIUM BLD-SCNC: 4.9 MMOL/L (ref 3.5–5.3)
RBC # BLD AUTO: 4.84 10*6/MM3 (ref 4.14–5.8)
SODIUM BLD-SCNC: 139 MMOL/L (ref 135–145)
WBC NRBC COR # BLD: 6.41 10*3/MM3 (ref 3.4–10.8)

## 2019-08-24 PROCEDURE — G0378 HOSPITAL OBSERVATION PER HR: HCPCS

## 2019-08-24 PROCEDURE — 82962 GLUCOSE BLOOD TEST: CPT

## 2019-08-24 PROCEDURE — 85027 COMPLETE CBC AUTOMATED: CPT | Performed by: INTERNAL MEDICINE

## 2019-08-24 PROCEDURE — 80048 BASIC METABOLIC PNL TOTAL CA: CPT | Performed by: NURSE PRACTITIONER

## 2019-08-24 RX ORDER — CLOPIDOGREL BISULFATE 75 MG/1
75 TABLET ORAL DAILY
Qty: 30 TABLET | Refills: 0 | Status: SHIPPED | OUTPATIENT
Start: 2019-08-24 | End: 2019-09-25 | Stop reason: SDUPTHER

## 2019-08-24 RX ORDER — PANTOPRAZOLE SODIUM 40 MG/1
40 TABLET, DELAYED RELEASE ORAL DAILY
Qty: 30 TABLET | Refills: 0 | Status: SHIPPED | OUTPATIENT
Start: 2019-08-24 | End: 2019-09-25 | Stop reason: SDUPTHER

## 2019-08-24 RX ADMIN — LISINOPRIL 5 MG: 5 TABLET ORAL at 09:52

## 2019-08-24 RX ADMIN — METOPROLOL TARTRATE 50 MG: 50 TABLET ORAL at 09:51

## 2019-08-24 RX ADMIN — ASPIRIN 81 MG: 81 TABLET, CHEWABLE ORAL at 09:52

## 2019-08-30 ENCOUNTER — APPOINTMENT (OUTPATIENT)
Dept: CARDIOLOGY | Facility: HOSPITAL | Age: 49
End: 2019-08-30

## 2019-09-25 NOTE — TELEPHONE ENCOUNTER
Please sign for Dr. Rasmussen is out.  Pts wife called left a msg that pt needs refills on Plavix and Protonix  sent to [facility].  Pt is completely out.  Gibran Beltran, CMA

## 2019-09-26 RX ORDER — CLOPIDOGREL BISULFATE 75 MG/1
75 TABLET ORAL DAILY
Qty: 90 TABLET | Refills: 3 | Status: SHIPPED | OUTPATIENT
Start: 2019-09-26 | End: 2020-02-28 | Stop reason: SDUPTHER

## 2019-09-26 RX ORDER — PANTOPRAZOLE SODIUM 40 MG/1
40 TABLET, DELAYED RELEASE ORAL DAILY
Qty: 90 TABLET | Refills: 3 | Status: SHIPPED | OUTPATIENT
Start: 2019-09-26 | End: 2019-12-09

## 2019-12-10 RX ORDER — OMEPRAZOLE 40 MG/1
40 CAPSULE, DELAYED RELEASE ORAL DAILY
Qty: 90 CAPSULE | Refills: 3 | Status: SHIPPED | OUTPATIENT
Start: 2019-12-10 | End: 2021-01-22 | Stop reason: ALTCHOICE

## 2020-02-28 RX ORDER — ATORVASTATIN CALCIUM 80 MG/1
80 TABLET, FILM COATED ORAL EVERY EVENING
Qty: 90 TABLET | Refills: 4 | Status: SHIPPED | OUTPATIENT
Start: 2020-02-28

## 2020-02-28 RX ORDER — CLOPIDOGREL BISULFATE 75 MG/1
75 TABLET ORAL DAILY
Qty: 90 TABLET | Refills: 4 | Status: SHIPPED | OUTPATIENT
Start: 2020-02-28

## 2020-02-28 RX ORDER — LISINOPRIL 5 MG/1
5 TABLET ORAL DAILY
Qty: 90 TABLET | Refills: 4 | Status: SHIPPED | OUTPATIENT
Start: 2020-02-28

## 2020-02-28 RX ORDER — ASPIRIN 81 MG/1
81 TABLET, CHEWABLE ORAL DAILY
Qty: 90 TABLET | Refills: 4 | Status: SHIPPED | OUTPATIENT
Start: 2020-02-28

## 2020-02-28 RX ORDER — METOPROLOL TARTRATE 50 MG/1
50 TABLET, FILM COATED ORAL EVERY 12 HOURS
Qty: 180 TABLET | Refills: 4 | Status: SHIPPED | OUTPATIENT
Start: 2020-02-28

## 2021-01-22 ENCOUNTER — LAB (OUTPATIENT)
Dept: LAB | Facility: HOSPITAL | Age: 51
End: 2021-01-22

## 2021-01-22 ENCOUNTER — OFFICE VISIT (OUTPATIENT)
Dept: CARDIOLOGY | Facility: CLINIC | Age: 51
End: 2021-01-22

## 2021-01-22 ENCOUNTER — PREP FOR SURGERY (OUTPATIENT)
Dept: OTHER | Facility: HOSPITAL | Age: 51
End: 2021-01-22

## 2021-01-22 VITALS
HEIGHT: 72 IN | SYSTOLIC BLOOD PRESSURE: 128 MMHG | BODY MASS INDEX: 36.03 KG/M2 | WEIGHT: 266 LBS | OXYGEN SATURATION: 99 % | DIASTOLIC BLOOD PRESSURE: 70 MMHG | HEART RATE: 74 BPM

## 2021-01-22 DIAGNOSIS — I25.10 CORONARY ARTERY DISEASE INVOLVING NATIVE CORONARY ARTERY OF NATIVE HEART WITHOUT ANGINA PECTORIS: Primary | ICD-10-CM

## 2021-01-22 DIAGNOSIS — I25.10 CORONARY ARTERY DISEASE INVOLVING NATIVE CORONARY ARTERY OF NATIVE HEART WITHOUT ANGINA PECTORIS: ICD-10-CM

## 2021-01-22 DIAGNOSIS — I10 ESSENTIAL HYPERTENSION: ICD-10-CM

## 2021-01-22 DIAGNOSIS — E78.2 MIXED HYPERLIPIDEMIA: ICD-10-CM

## 2021-01-22 DIAGNOSIS — E78.2 MIXED HYPERLIPIDEMIA: Primary | ICD-10-CM

## 2021-01-22 PROBLEM — R07.9 CHEST PAIN: Status: RESOLVED | Noted: 2019-08-22 | Resolved: 2021-01-22

## 2021-01-22 LAB
ALBUMIN SERPL-MCNC: 4.6 G/DL (ref 3.5–5.2)
ALBUMIN/GLOB SERPL: 1.5 G/DL
ALP SERPL-CCNC: 83 U/L (ref 39–117)
ALT SERPL W P-5'-P-CCNC: 109 U/L (ref 1–41)
ANION GAP SERPL CALCULATED.3IONS-SCNC: 11 MMOL/L (ref 5–15)
AST SERPL-CCNC: 62 U/L (ref 1–40)
BILIRUB SERPL-MCNC: 0.5 MG/DL (ref 0–1.2)
BUN SERPL-MCNC: 9 MG/DL (ref 6–20)
BUN/CREAT SERPL: 12.7 (ref 7–25)
CALCIUM SPEC-SCNC: 10.3 MG/DL (ref 8.6–10.5)
CHLORIDE SERPL-SCNC: 98 MMOL/L (ref 98–107)
CHOLEST SERPL-MCNC: 204 MG/DL (ref 0–200)
CO2 SERPL-SCNC: 25 MMOL/L (ref 22–29)
CREAT SERPL-MCNC: 0.71 MG/DL (ref 0.76–1.27)
GFR SERPL CREATININE-BSD FRML MDRD: 117 ML/MIN/1.73
GFR SERPL CREATININE-BSD FRML MDRD: 142 ML/MIN/1.73
GLOBULIN UR ELPH-MCNC: 3 GM/DL
GLUCOSE SERPL-MCNC: 222 MG/DL (ref 65–99)
HDLC SERPL-MCNC: 41 MG/DL (ref 40–60)
LDLC SERPL CALC-MCNC: 86 MG/DL (ref 0–100)
LDLC/HDLC SERPL: 1.66 {RATIO}
POTASSIUM SERPL-SCNC: 5.3 MMOL/L (ref 3.5–5.2)
PROT SERPL-MCNC: 7.6 G/DL (ref 6–8.5)
SODIUM SERPL-SCNC: 134 MMOL/L (ref 136–145)
TRIGL SERPL-MCNC: 474 MG/DL (ref 0–150)
VLDLC SERPL-MCNC: 77 MG/DL (ref 5–40)

## 2021-01-22 PROCEDURE — 36415 COLL VENOUS BLD VENIPUNCTURE: CPT

## 2021-01-22 PROCEDURE — 80061 LIPID PANEL: CPT

## 2021-01-22 PROCEDURE — 99214 OFFICE O/P EST MOD 30 MIN: CPT | Performed by: INTERNAL MEDICINE

## 2021-01-22 PROCEDURE — 93000 ELECTROCARDIOGRAM COMPLETE: CPT | Performed by: INTERNAL MEDICINE

## 2021-01-22 PROCEDURE — 80053 COMPREHEN METABOLIC PANEL: CPT

## 2021-01-22 RX ORDER — LIDOCAINE HYDROCHLORIDE 10 MG/ML
0.1 INJECTION, SOLUTION EPIDURAL; INFILTRATION; INTRACAUDAL; PERINEURAL ONCE AS NEEDED
Status: CANCELLED | OUTPATIENT
Start: 2021-01-22

## 2021-01-22 RX ORDER — CHOLECALCIFEROL (VITAMIN D3) 125 MCG
500 CAPSULE ORAL DAILY
COMMUNITY
End: 2022-04-15

## 2021-01-22 RX ORDER — PANTOPRAZOLE SODIUM 40 MG/1
40 TABLET, DELAYED RELEASE ORAL DAILY
COMMUNITY
Start: 2020-11-12

## 2021-01-22 RX ORDER — DIPHENHYDRAMINE HCL 25 MG
25 CAPSULE ORAL ONCE
Status: CANCELLED | OUTPATIENT
Start: 2021-01-22 | End: 2021-01-22

## 2021-01-22 RX ORDER — SODIUM CHLORIDE 9 MG/ML
125 INJECTION, SOLUTION INTRAVENOUS ONCE
Status: CANCELLED | OUTPATIENT
Start: 2021-01-22 | End: 2021-01-22

## 2021-01-22 NOTE — ASSESSMENT & PLAN NOTE
Needs to be repeated. His last profile had a trig level over 500 and ZI suspect he will need to be started on tricor

## 2021-01-22 NOTE — ASSESSMENT & PLAN NOTE
The patient denies chest pain, shortness of breath, dyspnea on exertion, orthopnea, PND, edema. There is no evidence of ongoing ischemia

## 2021-01-22 NOTE — PROGRESS NOTES
Referring Provider: Siddhartha Patterson MD    Reason for Follow-up Visit: CAD    Subjective .   Chief Complaint:   Chief Complaint   Patient presents with   • Follow-up     yearly   • Coronary Artery Disease     hx of STEMI/  pt states he still has some sob with climbing stairs.  this is not new.  its not getting any worse.  other than that he feels great.   • Hypertension     pt states he does not check bp at home.  its good today.   • Hyperlipidemia     last labs done 8/2019 showed LDL at 81 on Lipitor 80mg  pt will need new labs.       History of present illness:  David Sandifer is a 51 y.o. yo male with history of CAD, s/p ant STEMI 12/25/2016 treated with a DELIA. He is currently asymptomatic. Denies any CP or SOB.        History  Past Medical History:   Diagnosis Date   • Coronary artery disease    • Diabetes mellitus (CMS/[facility])     type 2   • Hyperlipidemia    • Hypertension    • Myocardial infarction (CMS/HCC) 12/25/2016    STEMI s/p DELIA LAD    • Sleep apnea     not diagnosed   ,   Past Surgical History:   Procedure Laterality Date   • CARDIAC CATHETERIZATION N/A 12/25/2016    Procedure: Left Heart Cath;  Surgeon: Jose Rasmussen MD;  Location:  [facility];  Service:    • CARDIAC CATHETERIZATION Bilateral 8/23/2019    Procedure: Coronary angiography;  Surgeon: Ronaldo Gutierrez MD;  Location:  [facility];  Service: Cardiology   • CARDIAC CATHETERIZATION N/A 8/23/2019    Procedure: Left ventriculography;  Surgeon: Ronaldo Gutierrez MD;  Location:  [facility];  Service: Cardiology   • CARDIAC CATHETERIZATION N/A 8/23/2019    Procedure: Left Heart Cath;  Surgeon: Ronaldo Gutierrez MD;  Location:  [facility];  Service: Cardiology   • CORONARY STENT PLACEMENT  12/25/2016    LAD    • THUMB CARPOMETACARPAL JOINT ARTHROPLASTY FLEXOR CARPI RADIALIS TENDON     ,   Family History   Problem Relation Age of Onset   • Diabetes Mother    • Cancer Father    • No  Known Problems Sister    • No Known Problems Brother    • Cancer Maternal Grandmother    • Heart attack Maternal Grandfather    • Cancer Paternal Grandmother    • No Known Problems Paternal Grandfather    • No Known Problems Sister    • No Known Problems Brother    ,   Social History     Tobacco Use   • Smoking status: Former Smoker     Types: Cigarettes, Electronic Cigarette     Start date:      Quit date: 2016     Years since quittin.0   • Smokeless tobacco: Never Used   Substance Use Topics   • Alcohol use: Yes     Alcohol/week: 2.0 standard drinks     Types: 2 Cans of beer per week     Comment: occasionally   • Drug use: Never   ,     Medications  Current Outpatient Medications   Medication Sig Dispense Refill   • aspirin 81 MG chewable tablet Chew 1 tablet Daily. 90 tablet 4   • atorvastatin (LIPITOR) 80 MG tablet Take 1 tablet by mouth Every Evening. 90 tablet 4   • clopidogrel (PLAVIX) 75 MG tablet Take 1 tablet by mouth Daily. 90 tablet 4   • ibuprofen (ADVIL,MOTRIN) 200 MG tablet Take 200 mg by mouth Every 6 (Six) Hours As Needed for Mild Pain (1-3).     • lisinopril (PRINIVIL,ZESTRIL) 5 MG tablet Take 1 tablet by mouth Daily. 90 tablet 4   • metFORMIN (GLUCOPHAGE) 500 MG tablet Take 1 tablet by mouth 2 (Two) Times a Day With Meals.     • metoprolol tartrate (LOPRESSOR) 50 MG tablet Take 1 tablet by mouth Every 12 (Twelve) Hours. 180 tablet 4   • Multiple Vitamins-Minerals (MULTIVITAMIN ADULT PO) Take  by mouth Daily.     • pantoprazole (PROTONIX) 40 MG EC tablet Take 40 mg by mouth Daily.     • vitamin B-12 (CYANOCOBALAMIN) 500 MCG tablet Take 500 mcg by mouth Daily.       No current facility-administered medications for this visit.        Allergies:  Patient has no known allergies.    Review of Systems  Review of Systems   HENT: Negative for nosebleeds.    Cardiovascular: Negative for chest pain, claudication, dyspnea on exertion, leg swelling, near-syncope, orthopnea, palpitations,  "paroxysmal nocturnal dyspnea and syncope.   Respiratory: Negative for hemoptysis and shortness of breath.    Gastrointestinal: Negative for melena.   Genitourinary: Negative for hematuria.       Objective     Physical Exam:  /70   Pulse 74   Ht 182.9 cm (72\")   Wt 121 kg (266 lb)   SpO2 99%   BMI 36.08 kg/m²   Cardiovascular:      Normal rate. Regular rhythm. S1 with normal intensity. S2 with normal intensity.      Murmurs: There is no murmur.   Edema:     Peripheral edema absent.         Results Review:    ECG 12 Lead    Date/Time: 1/22/2021 9:05 AM  Performed by: Jose Rasmussen MD  Authorized by: Jose Rasmussen MD   Comparison: compared with previous ECG   Similar to previous ECG  Rhythm: sinus rhythm  Rate: normal  Conduction: conduction normal  Q waves: V1, V2, V3 and V4    T Waves: T waves normal  QRS axis: normal    Clinical impression: abnormal EKG            No results displayed because visit has over 200 results.          Assessment/Plan   Problem List Items Addressed This Visit        Other    Mixed hyperlipidemia - Primary    Current Assessment & Plan     Needs to be repeated. His last profile had a trig level over 500 and ZI suspect he will need to be started on tricor         Essential hypertension    Current Assessment & Plan     Good control         Coronary artery disease involving native coronary artery of native heart without angina pectoris    Overview     12/25- STEMI s/p DELIA LAD per Dr. Rasmussen          Current Assessment & Plan     The patient denies chest pain, shortness of breath, dyspnea on exertion, orthopnea, PND, edema. There is no evidence of ongoing ischemia                 Medical Complexity  must have 2 out of 3     Moderate Complexity Level 4           1 of the following medical problems:          []One chronic illness with mild exacerbation         [x]Two or more stable chronic illness          []One new problem  []One acute illness with systemic symptoms    Complexity of " Data  Reviewed (1 out of the 3 following categories)      Category 1 tests, documents, historian (must have 3 points)       []Review of prior external records  [x]Review of results of unique tests, previous cath and echo  [x]Ordering unique tests lipid profile  []Assessment requires an independent historian    Category 2 Interpretation of tests   n/a    Category 3 Discuss Management/tests  n/a    Risk of complications and/or morbidity          [x]Prescription Drug Management          []Decision regarding minor surgery with pt         []Decision regarding elective major surgery         []Diagnosis or treatment of significantly limited by social determinants

## 2021-01-26 DIAGNOSIS — E78.2 MIXED HYPERLIPIDEMIA: Primary | ICD-10-CM

## 2021-01-26 NOTE — TELEPHONE ENCOUNTER
----- Message from Jose Rasmussen MD sent at 1/22/2021 11:50 AM CST -----  triglicerides are still high. Start tricor 145

## 2021-01-27 RX ORDER — FENOFIBRATE 145 MG/1
145 TABLET, COATED ORAL DAILY
Qty: 90 TABLET | Refills: 3 | Status: SHIPPED | OUTPATIENT
Start: 2021-01-27

## 2022-03-14 DIAGNOSIS — E78.2 MIXED HYPERLIPIDEMIA: Primary | ICD-10-CM

## 2022-04-05 ENCOUNTER — TELEPHONE (OUTPATIENT)
Dept: CARDIOLOGY | Facility: CLINIC | Age: 52
End: 2022-04-05

## 2022-04-05 NOTE — TELEPHONE ENCOUNTER
----- Message from Annette Teran RN sent at 3/1/2022  8:59 AM CST -----  CAN YOU CHECK ON THIS PATIENT AND HIS LIPIDS. HE WAS TO HAVE F/U LABS AFTER 3 MONTHS OF STARTING TRICOR. DATED 01/21.    ADAIR  ----- Message -----  From: SYSTEM  Sent: 3/1/2022  12:11 AM CST  To: [facility] Clinical Pool

## 2022-04-05 NOTE — TELEPHONE ENCOUNTER
Pts wife called said pt had the labs done at his pcp and will have them send the results to us so we will have it for his fu visit.  Gibran Beltran, CMA

## 2022-04-14 ENCOUNTER — TELEPHONE (OUTPATIENT)
Dept: CARDIOLOGY | Facility: CLINIC | Age: 52
End: 2022-04-14

## 2022-04-15 ENCOUNTER — OFFICE VISIT (OUTPATIENT)
Dept: CARDIOLOGY | Facility: CLINIC | Age: 52
End: 2022-04-15

## 2022-04-15 VITALS
DIASTOLIC BLOOD PRESSURE: 80 MMHG | HEIGHT: 72 IN | SYSTOLIC BLOOD PRESSURE: 146 MMHG | OXYGEN SATURATION: 96 % | HEART RATE: 92 BPM | WEIGHT: 248 LBS | BODY MASS INDEX: 33.59 KG/M2

## 2022-04-15 DIAGNOSIS — E78.2 MIXED HYPERLIPIDEMIA: ICD-10-CM

## 2022-04-15 DIAGNOSIS — Z95.5 S/P CORONARY ARTERY STENT PLACEMENT: ICD-10-CM

## 2022-04-15 DIAGNOSIS — E11.59 TYPE 2 DIABETES MELLITUS WITH OTHER CIRCULATORY COMPLICATION, WITHOUT LONG-TERM CURRENT USE OF INSULIN: ICD-10-CM

## 2022-04-15 DIAGNOSIS — I10 ESSENTIAL HYPERTENSION: ICD-10-CM

## 2022-04-15 DIAGNOSIS — E66.09 CLASS 1 OBESITY DUE TO EXCESS CALORIES WITH SERIOUS COMORBIDITY AND BODY MASS INDEX (BMI) OF 33.0 TO 33.9 IN ADULT: ICD-10-CM

## 2022-04-15 DIAGNOSIS — I51.9 LEFT VENTRICULAR SYSTOLIC DYSFUNCTION WITHOUT HEART FAILURE: ICD-10-CM

## 2022-04-15 DIAGNOSIS — I25.10 CORONARY ARTERY DISEASE INVOLVING NATIVE CORONARY ARTERY OF NATIVE HEART WITHOUT ANGINA PECTORIS: Primary | ICD-10-CM

## 2022-04-15 PROBLEM — E66.811 CLASS 1 OBESITY DUE TO EXCESS CALORIES WITH SERIOUS COMORBIDITY AND BODY MASS INDEX (BMI) OF 33.0 TO 33.9 IN ADULT: Status: ACTIVE | Noted: 2017-01-07

## 2022-04-15 PROCEDURE — 93000 ELECTROCARDIOGRAM COMPLETE: CPT | Performed by: NURSE PRACTITIONER

## 2022-04-15 PROCEDURE — 99214 OFFICE O/P EST MOD 30 MIN: CPT | Performed by: NURSE PRACTITIONER

## 2022-04-15 NOTE — PROGRESS NOTES
"    Subjective:     Encounter Date:04/15/2022      Patient ID: David Sandifer is a 52 y.o. male with CAD s/p DELIA to the LAD, LV systolic dysfunction, HTN, and HLD.    Chief Complaint:\"no complaints\"  Coronary Artery Disease  Presents for follow-up visit. Pertinent negatives include no chest pain, dizziness, leg swelling, palpitations, shortness of breath or weight gain. Risk factors include hyperlipidemia. The symptoms have been stable.   Hypertension  This is a chronic problem. The current episode started more than 1 year ago. The problem has been rapidly improving since onset. Pertinent negatives include no chest pain, malaise/fatigue, orthopnea, palpitations, PND or shortness of breath.   Hyperlipidemia  This is a chronic problem. The current episode started more than 1 year ago. The problem is uncontrolled. Recent lipid tests were reviewed and are high. Pertinent negatives include no chest pain or shortness of breath.     Patient presents today for a routine follow up. Patient is followed for CAD s/p DELIA to the proximal LAD in 2016 following a STEMI. He had a LHC in 8/2019 during a hospital admission for CP which noted patent stent in the proximal LAD. He reports he has been well. He notes occasional TREJO when overexerting which is chronic and unchanged. He denies chest pain, palpitations, and edema.     The following portions of the patient's history were reviewed and updated as appropriate: allergies, current medications, past family history, past medical history, past social history, past surgical history and problem list.    No Known Allergies    Current Outpatient Medications:   •  aspirin 81 MG chewable tablet, Chew 1 tablet Daily., Disp: 90 tablet, Rfl: 4  •  atorvastatin (LIPITOR) 80 MG tablet, Take 1 tablet by mouth Every Evening., Disp: 90 tablet, Rfl: 4  •  clopidogrel (PLAVIX) 75 MG tablet, Take 1 tablet by mouth Daily., Disp: 90 tablet, Rfl: 4  •  fenofibrate (TRICOR) 145 MG tablet, Take 1 tablet by " mouth Daily., Disp: 90 tablet, Rfl: 3  •  metFORMIN (GLUCOPHAGE) 1000 MG tablet, Take 1,000 mg by mouth 2 (Two) Times a Day With Meals., Disp: , Rfl:   •  metoprolol tartrate (LOPRESSOR) 50 MG tablet, Take 1 tablet by mouth Every 12 (Twelve) Hours., Disp: 180 tablet, Rfl: 4  •  pantoprazole (PROTONIX) 40 MG EC tablet, Take 40 mg by mouth Daily., Disp: , Rfl:   •  lisinopril (PRINIVIL,ZESTRIL) 5 MG tablet, Take 1 tablet by mouth Daily., Disp: 90 tablet, Rfl: 4  Past Medical History:   Diagnosis Date   • Coronary artery disease    • Diabetes mellitus (HCC)     type 2   • Hyperlipidemia    • Hypertension    • Myocardial infarction (HCC) 2016    STEMI s/p DELIA LAD    • Sleep apnea     not diagnosed       Social History     Socioeconomic History   • Marital status:    Tobacco Use   • Smoking status: Former Smoker     Types: Cigarettes, Electronic Cigarette     Start date:      Quit date: 2016     Years since quittin.3   • Smokeless tobacco: Never Used   Vaping Use   • Vaping Use: Some days   Substance and Sexual Activity   • Alcohol use: Yes     Alcohol/week: 2.0 standard drinks     Types: 2 Cans of beer per week     Comment: occasionally   • Drug use: Never   • Sexual activity: Defer       Review of Systems   Constitutional: Negative for malaise/fatigue, weight gain and weight loss.   Cardiovascular: Negative for chest pain, dyspnea on exertion, irregular heartbeat, leg swelling, near-syncope, orthopnea, palpitations, paroxysmal nocturnal dyspnea and syncope.   Respiratory: Negative for cough, shortness of breath, sleep disturbances due to breathing, sputum production and wheezing.    Skin: Negative for dry skin, flushing, itching and rash.   Gastrointestinal: Negative for hematemesis and hematochezia.   Neurological: Negative for dizziness, light-headedness, loss of balance and weakness.   All other systems reviewed and are negative.         Objective:     Vitals reviewed.   Constitutional:   "     General: Not in acute distress.     Appearance: Healthy appearance. Well-developed. Not diaphoretic.   Eyes:      General: No scleral icterus.     Conjunctiva/sclera: Conjunctivae normal.      Pupils: Pupils are equal, round, and reactive to light.   HENT:      Head: Normocephalic.    Mouth/Throat:      Pharynx: No oropharyngeal exudate.   Neck:      Vascular: No JVR.   Pulmonary:      Effort: Pulmonary effort is normal. No respiratory distress.      Breath sounds: Normal breath sounds. No wheezing. No rhonchi. No rales.   Chest:      Chest wall: Not tender to palpatation.   Cardiovascular:      Normal rate. Regular rhythm.   Pulses:     Intact distal pulses.   Edema:     Peripheral edema absent.   Abdominal:      General: Bowel sounds are normal. There is no distension.      Palpations: Abdomen is soft.      Tenderness: There is no abdominal tenderness.   Musculoskeletal: Normal range of motion.      Cervical back: Normal range of motion and neck supple. Skin:     General: Skin is warm and dry.      Coloration: Skin is not pale.      Findings: No erythema or rash.   Neurological:      Mental Status: Alert, oriented to person, place, and time and oriented to person, place and time.      Deep Tendon Reflexes: Reflexes are normal and symmetric.   Psychiatric:         Behavior: Behavior normal.             ECG 12 Lead    Date/Time: 4/15/2022 9:44 AM  Performed by: Jessie Castro APRN  Authorized by: Jessie Castro APRN   Comparison: compared with previous ECG from 1/22/2021  Similar to previous ECG  Rhythm: sinus rhythm  Rate: normal  BPM: 92  Conduction: 1st degree AV block  Q waves: V1, V2 and V3    ST Segments: ST segments normal  T Waves: T waves normal  QRS axis: normal  Other findings: T wave abnormality    Clinical impression: abnormal EKG          /80   Pulse 92   Ht 182.9 cm (72\")   Wt 112 kg (248 lb)   SpO2 96%   BMI 33.63 kg/m²     Lab Review:   I have reviewed previous office notes, " recent labs and recent cardiac testing.     Lab Results   Component Value Date    CHOL 204 (H) 01/22/2021    TRIG 474 (H) 01/22/2021    HDL 41 01/22/2021    LDL 86 01/22/2021     Cath 8/2019:   Conclusion of cardiac catheterization        Mildly elevated left ventricle end-diastolic pressure of 18 mmHg.  Patent stent noted in the proximal left anterior descending coronary artery  Left ventricle ejection fraction of approximately 50% with hypokinesis of the anterior apical apical inferior apical region.    Results for orders placed during the hospital encounter of 08/22/19    Adult Stress Echo W/ Cont or Stress Agent if Necessary Per Protocol    Interpretation Summary  · Estimated EF = 45%.  · Left ventricular systolic function is low normal.  · Abnormal stress echo with echocardiographic evidence for myocardial ischemia and scar tissue located in the anterior wall and apical wall.    Echo 8/2019:   Interpretation Summary    · The left ventricular cavity is mildly dilated.  · Estimated EF = 45%.  · The following left ventricular wall segments are akinetic: apical anterior, apical lateral, apical inferior, apical septal and apex.  · Mild dilation of the aortic root is present.  · Left ventricular wall thickness is consistent with mild concentric hypertrophy.  · Left ventricular diastolic dysfunction.  · No evidence of pulmonary hypertension is present.    Echo 12/2016:   Interpretation Summary    · Left ventricular diastolic dysfunction (grade II) consistent with pseudonormalization.  · Mild aortic valve regurgitation is present.  · Left ventricular function is mildly decreased. Estimated EF = 45%.      Cath 12/2016:  Conclusion:  #1 status post successful drug-eluting stent placement to the proximal LAD  #2 left anterior ejection fraction 45%           Assessment:          Diagnosis Plan   1. Coronary artery disease involving native coronary artery of native heart without angina pectoris  ECG 12 Lead   2. S/P  coronary artery stent placement     3. Left ventricular systolic dysfunction without heart failure     4. Essential hypertension     5. Mixed hyperlipidemia     6. Type 2 diabetes mellitus with other circulatory complication, without long-term current use of insulin (HCC)     7. Class 1 obesity due to excess calories with serious comorbidity and body mass index (BMI) of 33.0 to 33.9 in adult            Plan:       1. CAD- stable. No clinical signs of ongoing ischemia. Continue ASA, plavix, and statin  2. S/p coronary artery stent- to the proximal LAD in 2016 with stent noted to be patent per cath in 2019. Continue ASA and Plavix.   3. LV systolic dysfunction w/o HF- EF after STEMI noted to be 45%. Never had issues with HF. Continue ACEI and BB (on lopressor and has been for years, since MI).   4. HTN- controlled. Followed by PCP.   5. HLD- most recent LDL on file from 2021 and uncontrolled at 86. had a repeat lipid panel at PCP. Continue statin. I have discussed the importance of cholesterol control with a LDL goal of <70 as recommended by the AHA.    6. DM2 with CAD- A1C reported at 6.2, controlled. Followed by PCP. I have discussed the importance of A1C control with a goal of <7.0.   7. BMI- Patient's Body mass index is 33.63 kg/m². indicating that he is obese (BMI >30). Obesity-related health conditions include the following: hypertension, coronary heart disease, diabetes mellitus and dyslipidemias. Obesity is unchanged. BMI is is above average; BMI management plan is completed. We discussed portion control and increasing exercise.      Follow up in 1 year or sooner if symptoms worsen.     I spent 30 minutes caring for Doug on this date of service. This time includes time spent by me in the following activities:preparing for the visit, reviewing tests, obtaining and/or reviewing a separately obtained history, performing a medically appropriate examination and/or evaluation , counseling and educating the  patient/family/caregiver, ordering medications, tests, or procedures, documenting information in the medical record, independently interpreting results and communicating that information with the patient/family/caregiver and care coordination

## 2023-03-21 DIAGNOSIS — I25.10 CORONARY ARTERY DISEASE INVOLVING NATIVE CORONARY ARTERY OF NATIVE HEART WITHOUT ANGINA PECTORIS: ICD-10-CM

## 2023-03-21 DIAGNOSIS — E78.2 MIXED HYPERLIPIDEMIA: Primary | ICD-10-CM

## 2023-05-26 ENCOUNTER — LAB (OUTPATIENT)
Dept: LAB | Facility: HOSPITAL | Age: 53
End: 2023-05-26
Payer: COMMERCIAL

## 2023-05-26 ENCOUNTER — OFFICE VISIT (OUTPATIENT)
Dept: CARDIOLOGY | Facility: CLINIC | Age: 53
End: 2023-05-26
Payer: COMMERCIAL

## 2023-05-26 VITALS
BODY MASS INDEX: 33.46 KG/M2 | DIASTOLIC BLOOD PRESSURE: 78 MMHG | WEIGHT: 247 LBS | OXYGEN SATURATION: 96 % | SYSTOLIC BLOOD PRESSURE: 124 MMHG | HEIGHT: 72 IN | HEART RATE: 85 BPM

## 2023-05-26 DIAGNOSIS — E78.2 MIXED HYPERLIPIDEMIA: ICD-10-CM

## 2023-05-26 DIAGNOSIS — I25.10 CORONARY ARTERY DISEASE INVOLVING NATIVE CORONARY ARTERY OF NATIVE HEART WITHOUT ANGINA PECTORIS: Primary | ICD-10-CM

## 2023-05-26 DIAGNOSIS — E11.59 TYPE 2 DIABETES MELLITUS WITH OTHER CIRCULATORY COMPLICATION, WITHOUT LONG-TERM CURRENT USE OF INSULIN: ICD-10-CM

## 2023-05-26 DIAGNOSIS — I10 ESSENTIAL HYPERTENSION: ICD-10-CM

## 2023-05-26 DIAGNOSIS — E66.09 CLASS 1 OBESITY DUE TO EXCESS CALORIES WITH SERIOUS COMORBIDITY AND BODY MASS INDEX (BMI) OF 33.0 TO 33.9 IN ADULT: ICD-10-CM

## 2023-05-26 DIAGNOSIS — I51.9 LEFT VENTRICULAR SYSTOLIC DYSFUNCTION WITHOUT HEART FAILURE: ICD-10-CM

## 2023-05-26 LAB
CHOLEST SERPL-MCNC: 160 MG/DL (ref 0–200)
HDLC SERPL-MCNC: 40 MG/DL (ref 40–60)
LDLC SERPL CALC-MCNC: 90 MG/DL (ref 0–100)
LDLC/HDLC SERPL: 2.13 {RATIO}
TRIGL SERPL-MCNC: 175 MG/DL (ref 0–150)
VLDLC SERPL-MCNC: 30 MG/DL (ref 5–40)

## 2023-05-26 PROCEDURE — 36415 COLL VENOUS BLD VENIPUNCTURE: CPT

## 2023-05-26 PROCEDURE — 80061 LIPID PANEL: CPT

## 2023-05-26 NOTE — PROGRESS NOTES
"    Subjective:     Encounter Date:05/26/2023      Patient ID: David Sandifer is a 53 y.o. male     Chief Complaint:\"no complaints\"  Coronary Artery Disease  Presents for follow-up visit. Pertinent negatives include no chest pain, dizziness, leg swelling, palpitations, shortness of breath or weight gain. Risk factors include hyperlipidemia. The symptoms have been stable.   Hypertension  This is a chronic problem. The current episode started more than 1 year ago. The problem has been rapidly improving since onset. Pertinent negatives include no chest pain, malaise/fatigue, orthopnea, palpitations, PND or shortness of breath.   Hyperlipidemia  This is a chronic problem. The current episode started more than 1 year ago. The problem is uncontrolled. Recent lipid tests were reviewed and are high. Pertinent negatives include no chest pain or shortness of breath.     Patient presents today for a routine follow up. Patient is followed for CAD s/p DELIA to the proximal LAD in 2016 following a STEMI. LVEF at time of STEMI was noted to be reduced at 45%. He had an echo in 1/2017 to \"rule out pericarditis\" which noted EF had returned to normal of 60%. He had a LHC in 8/2019 during a hospital admission for CP which noted patent stent in the proximal LAD and LVEF 50% per LV gram and 45% per echo.     He reports he has been well. He notes occasional TREJO when overexerting which is chronic and unchanged. He denies chest pain, palpitations, and edema. He does not monitor his BP or his weight.     The following portions of the patient's history were reviewed and updated as appropriate: allergies, current medications, past family history, past medical history, past social history, past surgical history and problem list.    No Known Allergies    Current Outpatient Medications:     aspirin 81 MG chewable tablet, Chew 1 tablet Daily., Disp: 90 tablet, Rfl: 4    atorvastatin (LIPITOR) 80 MG tablet, Take 1 tablet by mouth Every Evening., " Disp: 90 tablet, Rfl: 4    clopidogrel (PLAVIX) 75 MG tablet, Take 1 tablet by mouth Daily., Disp: 90 tablet, Rfl: 4    fenofibrate (TRICOR) 145 MG tablet, Take 1 tablet by mouth Daily., Disp: 90 tablet, Rfl: 3    lisinopril (PRINIVIL,ZESTRIL) 5 MG tablet, Take 1 tablet by mouth Daily., Disp: 90 tablet, Rfl: 4    metFORMIN (GLUCOPHAGE) 1000 MG tablet, Take 1 tablet by mouth 2 (Two) Times a Day With Meals., Disp: , Rfl:     pantoprazole (PROTONIX) 40 MG EC tablet, Take 1 tablet by mouth Daily., Disp: , Rfl:     metoprolol tartrate (LOPRESSOR) 50 MG tablet, Take 1 tablet by mouth Every 12 (Twelve) Hours. (Patient not taking: Reported on 2023), Disp: 180 tablet, Rfl: 4  Past Medical History:   Diagnosis Date    Coronary artery disease     Diabetes mellitus     type 2    Hyperlipidemia     Hypertension     Myocardial infarction 2016    STEMI s/p DELIA LAD     Sleep apnea     not diagnosed       Social History     Socioeconomic History    Marital status:    Tobacco Use    Smoking status: Former     Types: Cigarettes, Electronic Cigarette     Start date:      Quit date: 2016     Years since quittin.4     Passive exposure: Past    Smokeless tobacco: Never   Vaping Use    Vaping Use: Some days    Substances: Nicotine, Flavoring    Devices: Disposable    Passive vaping exposure: Yes   Substance and Sexual Activity    Alcohol use: Yes     Alcohol/week: 2.0 standard drinks     Types: 2 Cans of beer per week     Comment: occasionally    Drug use: Never    Sexual activity: Defer       Review of Systems   Constitutional: Negative for malaise/fatigue, weight gain and weight loss.   Cardiovascular:  Negative for chest pain, dyspnea on exertion, irregular heartbeat, leg swelling, near-syncope, orthopnea, palpitations, paroxysmal nocturnal dyspnea and syncope.   Respiratory:  Negative for cough, shortness of breath, sleep disturbances due to breathing, sputum production and wheezing.    Skin:  Negative  for dry skin, flushing, itching and rash.   Gastrointestinal:  Negative for hematemesis and hematochezia.   Neurological:  Negative for dizziness, light-headedness, loss of balance and weakness.   All other systems reviewed and are negative.       Objective:     Vitals reviewed.   Constitutional:       General: Not in acute distress.     Appearance: Healthy appearance. Well-developed. Not diaphoretic.   Eyes:      General: No scleral icterus.     Conjunctiva/sclera: Conjunctivae normal.      Pupils: Pupils are equal, round, and reactive to light.   HENT:      Head: Normocephalic.    Mouth/Throat:      Pharynx: No oropharyngeal exudate.   Neck:      Vascular: No JVR.   Pulmonary:      Effort: Pulmonary effort is normal. No respiratory distress.      Breath sounds: Normal breath sounds. No wheezing. No rhonchi. No rales.   Chest:      Chest wall: Not tender to palpatation.   Cardiovascular:      Normal rate. Regular rhythm.   Pulses:     Intact distal pulses.   Edema:     Peripheral edema absent.   Abdominal:      General: Bowel sounds are normal. There is no distension.      Palpations: Abdomen is soft.      Tenderness: There is no abdominal tenderness.   Musculoskeletal: Normal range of motion.      Cervical back: Normal range of motion and neck supple. Skin:     General: Skin is warm and dry.      Coloration: Skin is not pale.      Findings: No erythema or rash.   Neurological:      Mental Status: Alert, oriented to person, place, and time and oriented to person, place and time.      Deep Tendon Reflexes: Reflexes are normal and symmetric.   Psychiatric:         Behavior: Behavior normal.           ECG 12 Lead    Date/Time: 5/26/2023 11:49 AM  Performed by: Jessie Castro APRN  Authorized by: Jessie Castro APRN   Comparison: compared with previous ECG from 4/15/2022  Similar to previous ECG  Rhythm: sinus rhythm  Rate: normal  BPM: 85  Conduction: 1st degree AV block  Q waves: V1, V2 and V3    T inversion: aVL  "and I  QRS axis: normal  Other findings: T wave abnormality    Clinical impression: abnormal EKG      /78 (BP Location: Left arm, Patient Position: Sitting, Cuff Size: Adult)   Pulse 85   Ht 182.9 cm (72\")   Wt 112 kg (247 lb)   SpO2 96%   BMI 33.50 kg/m²     Lab Review:   I have reviewed previous office notes, recent labs and recent cardiac testing.     Cath 8/2019:   Conclusion of cardiac catheterization   Mildly elevated left ventricle end-diastolic pressure of 18 mmHg.  Patent stent noted in the proximal left anterior descending coronary artery  Left ventricle ejection fraction of approximately 50% with hypokinesis of the anterior apical apical inferior apical region.    Echo 8/2019:   Interpretation Summary    The left ventricular cavity is mildly dilated.  Estimated EF = 45%.  The following left ventricular wall segments are akinetic: apical anterior, apical lateral, apical inferior, apical septal and apex.  Mild dilation of the aortic root is present.  Left ventricular wall thickness is consistent with mild concentric hypertrophy.  Left ventricular diastolic dysfunction.  No evidence of pulmonary hypertension is present.        Cath 12/2016:  Conclusion:  #1 status post successful drug-eluting stent placement to the proximal LAD  #2 left anterior ejection fraction 45%           Assessment:          Diagnosis Plan   1. Coronary artery disease involving native coronary artery of native heart without angina pectoris        2. Left ventricular systolic dysfunction without heart failure        3. Essential hypertension        4. Mixed hyperlipidemia        5. Type 2 diabetes mellitus with other circulatory complication, without long-term current use of insulin        6. Class 1 obesity due to excess calories with serious comorbidity and body mass index (BMI) of 33.0 to 33.9 in adult               Plan:       1. CAD- stable. S/p DELIA to the proximal LAD in 2016. No clinical signs of ongoing ischemia. " Continue ASA, plavix, ACEi, BB and statin  2. LV systolic dysfunction w/o HF- EF after STEMI noted to be 45%, improved to 60% per echo in 2017, and noted to be 50% per LV gram with cath in 8/2019 and 45% per echo. Has never had issues with HF. Continue ACEI and BB (on lopressor and has been for years, since MI).   3. HTN- controlled. Followed by PCP.   4. HLD- lipid panel pending. Currently on Liptior 80mg daily. Will consider addition of Zetia vs changing lipitor to crestor and adding Zetia if LDL is above goal of 70. Will fax results to PCP per patient request.    5. DM2 with CAD- reports controlled and thinks it was 7 at check last year.   Followed by PCP.  6. BMI- Patient's Body mass index is 33.5 kg/m². indicating that he is obese (BMI >30). Obesity-related health conditions include the following: hypertension, coronary heart disease, diabetes mellitus and dyslipidemias. Obesity is unchanged. BMI is is above average; BMI management plan is completed. We discussed portion control and increasing exercise.      Follow up in 1 year or sooner if symptoms worsen.     I spent 30 minutes caring for Doug on this date of service. This time includes time spent by me in the following activities:preparing for the visit, reviewing tests, obtaining and/or reviewing a separately obtained history, performing a medically appropriate examination and/or evaluation , counseling and educating the patient/family/caregiver, ordering medications, tests, or procedures, documenting information in the medical record, independently interpreting results and communicating that information with the patient/family/caregiver and care coordination    I spent 2 minutes on the separately reported service of EKG interpretation. This time is not included in the time used to support the E/M service also reported today.

## 2023-05-31 ENCOUNTER — TELEPHONE (OUTPATIENT)
Dept: CARDIOLOGY | Facility: CLINIC | Age: 53
End: 2023-05-31

## 2023-05-31 NOTE — TELEPHONE ENCOUNTER
----- Message from SANDOR Mayo sent at 5/30/2023  6:45 AM CDT -----  His Ldl is 90. I recommend starting Zetia.

## 2023-05-31 NOTE — TELEPHONE ENCOUNTER
Patient notified, voiced understanding and agreement to proceed.  Jessie notified to please place order.  Lia Dallas MA

## 2023-06-06 RX ORDER — EZETIMIBE 10 MG/1
10 TABLET ORAL DAILY
Qty: 90 TABLET | Refills: 3 | Status: SHIPPED | OUTPATIENT
Start: 2023-06-06

## 2024-06-07 ENCOUNTER — TELEPHONE (OUTPATIENT)
Dept: CARDIOLOGY | Facility: CLINIC | Age: 54
End: 2024-06-07
Payer: COMMERCIAL

## 2024-06-07 ENCOUNTER — OFFICE VISIT (OUTPATIENT)
Dept: CARDIOLOGY | Facility: CLINIC | Age: 54
End: 2024-06-07
Payer: COMMERCIAL

## 2024-06-07 ENCOUNTER — LAB (OUTPATIENT)
Dept: LAB | Facility: HOSPITAL | Age: 54
End: 2024-06-07
Payer: COMMERCIAL

## 2024-06-07 VITALS
HEART RATE: 61 BPM | DIASTOLIC BLOOD PRESSURE: 80 MMHG | WEIGHT: 248 LBS | OXYGEN SATURATION: 97 % | BODY MASS INDEX: 33.59 KG/M2 | HEIGHT: 72 IN | SYSTOLIC BLOOD PRESSURE: 126 MMHG

## 2024-06-07 DIAGNOSIS — I25.10 CORONARY ARTERY DISEASE INVOLVING NATIVE CORONARY ARTERY OF NATIVE HEART WITHOUT ANGINA PECTORIS: Primary | ICD-10-CM

## 2024-06-07 DIAGNOSIS — I51.9 LEFT VENTRICULAR SYSTOLIC DYSFUNCTION WITHOUT HEART FAILURE: ICD-10-CM

## 2024-06-07 DIAGNOSIS — E78.2 MIXED HYPERLIPIDEMIA: ICD-10-CM

## 2024-06-07 DIAGNOSIS — E66.09 CLASS 1 OBESITY DUE TO EXCESS CALORIES WITH SERIOUS COMORBIDITY AND BODY MASS INDEX (BMI) OF 33.0 TO 33.9 IN ADULT: ICD-10-CM

## 2024-06-07 DIAGNOSIS — I10 ESSENTIAL HYPERTENSION: ICD-10-CM

## 2024-06-07 DIAGNOSIS — E11.59 TYPE 2 DIABETES MELLITUS WITH OTHER CIRCULATORY COMPLICATION, WITHOUT LONG-TERM CURRENT USE OF INSULIN: ICD-10-CM

## 2024-06-07 DIAGNOSIS — I25.10 CORONARY ARTERY DISEASE INVOLVING NATIVE CORONARY ARTERY OF NATIVE HEART WITHOUT ANGINA PECTORIS: ICD-10-CM

## 2024-06-07 LAB
ALBUMIN SERPL-MCNC: 4.7 G/DL (ref 3.5–5.2)
ALBUMIN/GLOB SERPL: 1.9 G/DL
ALP SERPL-CCNC: 57 U/L (ref 39–117)
ALT SERPL W P-5'-P-CCNC: 81 U/L (ref 1–41)
ANION GAP SERPL CALCULATED.3IONS-SCNC: 9 MMOL/L (ref 5–15)
AST SERPL-CCNC: 58 U/L (ref 1–40)
BASOPHILS # BLD AUTO: 0.05 10*3/MM3 (ref 0–0.2)
BASOPHILS NFR BLD AUTO: 0.7 % (ref 0–1.5)
BILIRUB SERPL-MCNC: 0.7 MG/DL (ref 0–1.2)
BUN SERPL-MCNC: 9 MG/DL (ref 6–20)
BUN/CREAT SERPL: 13.2 (ref 7–25)
CALCIUM SPEC-SCNC: 10 MG/DL (ref 8.6–10.5)
CHLORIDE SERPL-SCNC: 104 MMOL/L (ref 98–107)
CHOLEST SERPL-MCNC: 146 MG/DL (ref 0–200)
CO2 SERPL-SCNC: 29 MMOL/L (ref 22–29)
CREAT SERPL-MCNC: 0.68 MG/DL (ref 0.76–1.27)
DEPRECATED RDW RBC AUTO: 41.5 FL (ref 37–54)
EGFRCR SERPLBLD CKD-EPI 2021: 110.5 ML/MIN/1.73
EOSINOPHIL # BLD AUTO: 0.35 10*3/MM3 (ref 0–0.4)
EOSINOPHIL NFR BLD AUTO: 5.2 % (ref 0.3–6.2)
ERYTHROCYTE [DISTWIDTH] IN BLOOD BY AUTOMATED COUNT: 12.7 % (ref 12.3–15.4)
GLOBULIN UR ELPH-MCNC: 2.5 GM/DL
GLUCOSE SERPL-MCNC: 134 MG/DL (ref 65–99)
HBA1C MFR BLD: 8 % (ref 4.8–5.6)
HCT VFR BLD AUTO: 46.3 % (ref 37.5–51)
HDLC SERPL-MCNC: 44 MG/DL (ref 40–60)
HGB BLD-MCNC: 15.7 G/DL (ref 13–17.7)
IMM GRANULOCYTES # BLD AUTO: 0.03 10*3/MM3 (ref 0–0.05)
IMM GRANULOCYTES NFR BLD AUTO: 0.4 % (ref 0–0.5)
LDLC SERPL CALC-MCNC: 77 MG/DL (ref 0–100)
LDLC/HDLC SERPL: 1.68 {RATIO}
LYMPHOCYTES # BLD AUTO: 2.27 10*3/MM3 (ref 0.7–3.1)
LYMPHOCYTES NFR BLD AUTO: 33.7 % (ref 19.6–45.3)
MCH RBC QN AUTO: 30 PG (ref 26.6–33)
MCHC RBC AUTO-ENTMCNC: 33.9 G/DL (ref 31.5–35.7)
MCV RBC AUTO: 88.5 FL (ref 79–97)
MONOCYTES # BLD AUTO: 0.53 10*3/MM3 (ref 0.1–0.9)
MONOCYTES NFR BLD AUTO: 7.9 % (ref 5–12)
NEUTROPHILS NFR BLD AUTO: 3.51 10*3/MM3 (ref 1.7–7)
NEUTROPHILS NFR BLD AUTO: 52.1 % (ref 42.7–76)
NRBC BLD AUTO-RTO: 0 /100 WBC (ref 0–0.2)
PLATELET # BLD AUTO: 298 10*3/MM3 (ref 140–450)
PMV BLD AUTO: 10.4 FL (ref 6–12)
POTASSIUM SERPL-SCNC: 4.8 MMOL/L (ref 3.5–5.2)
PROT SERPL-MCNC: 7.2 G/DL (ref 6–8.5)
RBC # BLD AUTO: 5.23 10*6/MM3 (ref 4.14–5.8)
SODIUM SERPL-SCNC: 142 MMOL/L (ref 136–145)
TRIGL SERPL-MCNC: 141 MG/DL (ref 0–150)
VLDLC SERPL-MCNC: 25 MG/DL (ref 5–40)
WBC NRBC COR # BLD AUTO: 6.74 10*3/MM3 (ref 3.4–10.8)

## 2024-06-07 PROCEDURE — 80053 COMPREHEN METABOLIC PANEL: CPT

## 2024-06-07 PROCEDURE — 85025 COMPLETE CBC W/AUTO DIFF WBC: CPT

## 2024-06-07 PROCEDURE — 83036 HEMOGLOBIN GLYCOSYLATED A1C: CPT

## 2024-06-07 PROCEDURE — 36415 COLL VENOUS BLD VENIPUNCTURE: CPT

## 2024-06-07 PROCEDURE — 80061 LIPID PANEL: CPT

## 2024-06-07 NOTE — TELEPHONE ENCOUNTER
----- Message from Jessie Matthew sent at 6/7/2024  1:29 PM CDT -----  Please let patient know his LDL is still above goal of <70 at 77 despite being starting Zetia. He really needs to watch his cholesterol intake. His kidney function is stable but liver enzymes are elevated but better than what they were. His A1C is high at 8.0. he needs to get in and see [facility] about better diabetes control and let him know we will forward the results.

## 2024-06-07 NOTE — PROGRESS NOTES
"    Subjective:     Encounter Date:06/07/2024      Patient ID: David Sandifer is a 54 y.o. male     Chief Complaint:\"no complaints\"  Coronary Artery Disease  Presents for follow-up visit. Pertinent negatives include no chest pain, dizziness, leg swelling, palpitations, shortness of breath or weight gain. Risk factors include hyperlipidemia. The symptoms have been stable.   Hypertension  This is a chronic problem. The current episode started more than 1 year ago. The problem has been rapidly improving since onset. Pertinent negatives include no chest pain, malaise/fatigue, orthopnea, palpitations, PND or shortness of breath.   Hyperlipidemia  This is a chronic problem. The current episode started more than 1 year ago. The problem is uncontrolled. Recent lipid tests were reviewed and are high. Pertinent negatives include no chest pain or shortness of breath.     Patient presents today for a routine follow up. Patient is followed for CAD s/p DELIA to the proximal LAD in 2016 following a STEMI. LVEF at time of STEMI was noted to be reduced at 45%. He had an echo in 1/2017 to \"rule out pericarditis\" which noted EF had returned to normal of 60%. He had a LHC in 8/2019 during a hospital admission for CP which noted patent stent in the proximal LAD and LVEF 50% per LV gram and 45% per echo.     He reports he has been well. He notes occasional TREJO when overexerting which is chronic and unchanged. He denies chest pain, palpitations, and edema. He does not monitor his BP or his weight at home. He has not seen his PCP for his yearly physical since last year and reports [place] wanted him to pay for his labs upfront rather than billing his insurance which he could not afford.       The following portions of the patient's history were reviewed and updated as appropriate: allergies, current medications, past family history, past medical history, past social history, past surgical history and problem list.    No Known " Allergies    Current Outpatient Medications:     aspirin 81 MG chewable tablet, Chew 1 tablet Daily., Disp: 90 tablet, Rfl: 4    atorvastatin (LIPITOR) 80 MG tablet, Take 1 tablet by mouth Every Evening., Disp: 90 tablet, Rfl: 4    clopidogrel (PLAVIX) 75 MG tablet, Take 1 tablet by mouth Daily., Disp: 90 tablet, Rfl: 4    ezetimibe (ZETIA) 10 MG tablet, Take 1 tablet by mouth Daily., Disp: 90 tablet, Rfl: 3    fenofibrate (TRICOR) 145 MG tablet, Take 1 tablet by mouth Daily., Disp: 90 tablet, Rfl: 3    lisinopril (PRINIVIL,ZESTRIL) 5 MG tablet, Take 1 tablet by mouth Daily., Disp: 90 tablet, Rfl: 4    metFORMIN (GLUCOPHAGE) 1000 MG tablet, Take 1 tablet by mouth 2 (Two) Times a Day With Meals., Disp: , Rfl:     pantoprazole (PROTONIX) 40 MG EC tablet, Take 1 tablet by mouth Daily., Disp: , Rfl:     metoprolol tartrate (LOPRESSOR) 50 MG tablet, Take 1 tablet by mouth Every 12 (Twelve) Hours., Disp: 180 tablet, Rfl: 4  Past Medical History:   Diagnosis Date    Coronary artery disease     Diabetes mellitus     type 2    Hyperlipidemia     Hypertension     Myocardial infarction 2016    STEMI s/p DELIA LAD     Sleep apnea     not diagnosed       Social History     Socioeconomic History    Marital status:    Tobacco Use    Smoking status: Former     Current packs/day: 0.00     Types: Cigarettes, Electronic Cigarette     Start date:      Quit date: 2016     Years since quittin.4     Passive exposure: Past    Smokeless tobacco: Never   Vaping Use    Vaping status: Some Days    Substances: Nicotine, Flavoring    Devices: Disposable    Passive vaping exposure: Yes   Substance and Sexual Activity    Alcohol use: Yes     Alcohol/week: 2.0 standard drinks of alcohol     Types: 2 Cans of beer per week     Comment: occasionally    Drug use: Never    Sexual activity: Defer       Review of Systems   Constitutional: Negative for malaise/fatigue, weight gain and weight loss.   Cardiovascular:  Negative for  chest pain, dyspnea on exertion, irregular heartbeat, leg swelling, near-syncope, orthopnea, palpitations, paroxysmal nocturnal dyspnea and syncope.   Respiratory:  Negative for cough, shortness of breath, sleep disturbances due to breathing, sputum production and wheezing.    Skin:  Negative for dry skin, flushing, itching and rash.   Gastrointestinal:  Negative for hematemesis and hematochezia.   Neurological:  Negative for dizziness, light-headedness, loss of balance and weakness.   All other systems reviewed and are negative.         Objective:     Vitals reviewed.   Constitutional:       General: Not in acute distress.     Appearance: Healthy appearance. Well-developed. Obese. Not diaphoretic.   Eyes:      General: No scleral icterus.     Conjunctiva/sclera: Conjunctivae normal.      Pupils: Pupils are equal, round, and reactive to light.   HENT:      Head: Normocephalic.    Mouth/Throat:      Pharynx: No oropharyngeal exudate.   Neck:      Vascular: No JVR.   Pulmonary:      Effort: Pulmonary effort is normal. No respiratory distress.      Breath sounds: Normal breath sounds. No wheezing. No rhonchi. No rales.   Chest:      Chest wall: Not tender to palpatation.   Cardiovascular:      Normal rate. Regular rhythm.   Pulses:     Intact distal pulses.   Edema:     Peripheral edema absent.   Abdominal:      General: Bowel sounds are normal. There is no distension.      Palpations: Abdomen is soft.      Tenderness: There is no abdominal tenderness.   Musculoskeletal: Normal range of motion.      Cervical back: Normal range of motion and neck supple. Skin:     General: Skin is warm and dry.      Coloration: Skin is not pale.      Findings: No erythema or rash.   Neurological:      Mental Status: Alert, oriented to person, place, and time and oriented to person, place and time.      Deep Tendon Reflexes: Reflexes are normal and symmetric.   Psychiatric:         Behavior: Behavior normal.             ECG 12  "Lead    Date/Time: 6/7/2024 11:33 AM  Performed by: Jessie Castro APRN    Authorized by: Jessie Castro APRN  Comparison: compared with previous ECG from 5/26/2023  Similar to previous ECG  Comparison to previous ECG: PVCs are now present  Rhythm: sinus rhythm  Ectopy: unifocal PVCs  Rate: normal  BPM: 61  Conduction: 1st degree AV block  Q waves: V3, V1 and V2    ST Segments: ST segments normal  T inversion: V5, V4 and I  QRS axis: normal  Other findings: T wave abnormality    Clinical impression: abnormal EKG        /80 (BP Location: Left arm, Patient Position: Sitting, Cuff Size: Adult)   Pulse 61   Ht 182.9 cm (72\")   Wt 112 kg (248 lb)   SpO2 97%   BMI 33.63 kg/m²     Lab Review:   I have reviewed previous office notes, recent labs and recent cardiac testing.     Lab Results   Component Value Date    CHOL 160 05/26/2023    TRIG 175 (H) 05/26/2023    HDL 40 05/26/2023    LDL 90 05/26/2023       Cath 8/2019:   Conclusion of cardiac catheterization   Mildly elevated left ventricle end-diastolic pressure of 18 mmHg.  Patent stent noted in the proximal left anterior descending coronary artery  Left ventricle ejection fraction of approximately 50% with hypokinesis of the anterior apical apical inferior apical region.    Echo 8/2019:   Interpretation Summary    The left ventricular cavity is mildly dilated.  Estimated EF = 45%.  The following left ventricular wall segments are akinetic: apical anterior, apical lateral, apical inferior, apical septal and apex.  Mild dilation of the aortic root is present.  Left ventricular wall thickness is consistent with mild concentric hypertrophy.  Left ventricular diastolic dysfunction.  No evidence of pulmonary hypertension is present.        Cath 12/2016:  Conclusion:  #1 status post successful drug-eluting stent placement to the proximal LAD  #2 left anterior ejection fraction 45%           Assessment:          Diagnosis Plan   1. Coronary artery disease " involving native coronary artery of native heart without angina pectoris  CBC & Differential      2. Left ventricular systolic dysfunction without heart failure        3. Essential hypertension        4. Mixed hyperlipidemia  Lipid Panel    Comprehensive Metabolic Panel      5. Type 2 diabetes mellitus with other circulatory complication, without long-term current use of insulin  Hemoglobin A1c      6. Class 1 obesity due to excess calories with serious comorbidity and body mass index (BMI) of 33.0 to 33.9 in adult             Plan:       1. CAD- stable. S/p DELIA to the proximal LAD in 2016. No clinical signs of ongoing ischemia. Continue ASA, plavix, ACEi, BB and statin.   2. LV systolic dysfunction w/o HF- EF after STEMI noted to be 45%, improved to 60% per echo in 2017, and noted to be 50% per LV gram with cath in 8/2019 and 45% per echo. Has never had issues with HF. Continue ACEI and BB (on lopressor and has been for years, since MI). He is unsure if he is taking his lopressor but based on his HR today, I think he is.   3. HTN- controlled. Followed by PCP.   4. HLD- lipid and CMP ordered today. Will call with results. Continue statin and Zetia.   5. DM2 with CAD- has not been checked since last year.   Followed by PCP.  6. BMI- Patient's Body mass index is 33.63 kg/m². indicating that he is obese (BMI >30). Obesity-related health conditions include the following: hypertension, coronary heart disease, diabetes mellitus and dyslipidemias. Obesity is unchanged. BMI is is above average; BMI management plan is completed. We discussed portion control and increasing exercise.      Follow up in 1 year or sooner if symptoms worsen.     I spent 30 minutes caring for Doug on this date of service. This time includes time spent by me in the following activities:preparing for the visit, reviewing tests, obtaining and/or reviewing a separately obtained history, performing a medically appropriate examination and/or evaluation  , counseling and educating the patient/family/caregiver, ordering medications, tests, or procedures, documenting information in the medical record, independently interpreting results and communicating that information with the patient/family/caregiver and care coordination    I spent 2 minutes on the separately reported service of EKG interpretation. This time is not included in the time used to support the E/M service also reported today.

## 2024-08-06 RX ORDER — EZETIMIBE 10 MG/1
10 TABLET ORAL DAILY
Qty: 90 TABLET | Refills: 3 | Status: SHIPPED | OUTPATIENT
Start: 2024-08-06

## 2025-03-18 NOTE — TELEPHONE ENCOUNTER
Spoke with patient and updated him to take ASpirin 81 mg for life. No need to start Plavix.    ----- Message from Jose Rasmussen MD sent at 4/24/2018 11:59 AM CDT -----  He can just take asa now  ----- Message -----  From: SANDOR Busby  Sent: 4/23/2018  11:45 AM  To: MD Dr. Grady Danielle,  This patient had STEMI 12/25/16. He was having cost issues with Effient. He has been off Effient for over a month without issue. He was supposed to start Plavix but didn't. Since he is over a year and has done okay thus far should he stay off Plavix and aspirin only. Or should I start him on Plavix?       : Yes

## (undated) DEVICE — FR5 INFINITI MULTIPAC: Brand: INFINITI

## (undated) DEVICE — ELECTRD PAD DEFIB A/

## (undated) DEVICE — MODEL BT2000 P/N 700287-012KIT CONTENTS: MANIFOLD WITH SALINE AND CONTRAST PORTS, SALINE TUBING WITH SPIKE AND HAND SYRINGE, TRANSDUCER: Brand: BT2000 AUTOMATED MANIFOLD KIT

## (undated) DEVICE — SOLIDIFIER LIQUI LOC PLUS 2000CC

## (undated) DEVICE — CANN CO2/O2 NASL A/

## (undated) DEVICE — PK CATH CARD 30 CA/4

## (undated) DEVICE — MODEL AT P65, P/N 701554-001KIT CONTENTS: HAND CONTROLLER, 3-WAY HIGH-PRESSURE STOPCOCK WITH ROTATING END AND PREMIUM HIGH-PRESSURE TUBING: Brand: ANGIOTOUCH® KIT

## (undated) DEVICE — GW STARTER FXD CORE J .035 3X150CM 3MM

## (undated) DEVICE — PINNACLE INTRODUCER SHEATH: Brand: PINNACLE

## (undated) DEVICE — MYNXGRIP 6F/7F: Brand: MYNXGRIP

## (undated) DEVICE — CATH F5 INF JL 4.5 100CM: Brand: INFINITI

## (undated) DEVICE — SOL IRR NACL 0.9PCT BT 1000ML